# Patient Record
Sex: FEMALE | Race: WHITE | NOT HISPANIC OR LATINO | Employment: FULL TIME | URBAN - METROPOLITAN AREA
[De-identification: names, ages, dates, MRNs, and addresses within clinical notes are randomized per-mention and may not be internally consistent; named-entity substitution may affect disease eponyms.]

---

## 2017-01-17 ENCOUNTER — ALLSCRIPTS OFFICE VISIT (OUTPATIENT)
Dept: OTHER | Facility: OTHER | Age: 62
End: 2017-01-17

## 2018-01-12 VITALS
SYSTOLIC BLOOD PRESSURE: 138 MMHG | DIASTOLIC BLOOD PRESSURE: 90 MMHG | RESPIRATION RATE: 20 BRPM | HEIGHT: 62 IN | TEMPERATURE: 97.1 F | HEART RATE: 88 BPM

## 2018-01-18 NOTE — PROGRESS NOTES
Assessment    1  Encounter for preventive health examination (V70 0) (Z00 00)   2  Colon cancer screening (V76 51) (Z12 11)   3  Screening mammogram, encounter for (V76 12) (Z12 31)   4  BMI 40 0-44 9, adult (V85 41) (Z68 41)    Plan  Colon cancer screening    · 1 - Nazario Herrera MD (Gastroenterology) Physician Referral  Consult Only: the expectation  is that the referring provider will communicate back to the patient on treatment options  Evaluation and Treatment: the expectation is that the referred to provider will  communicate back to the patient on treatment options  Status: Active  Requested for:  [id number]  Care Summary provided  : Yes   · COLONOSCOPY; Status:Active; Requested for:07Sck3868;   Hypertension, Screening for cardiovascular condition, Screening for diabetes mellitus    · (1) CBC/PLT/DIFF; Status:Active; Requested for:[id number];    · (1) TSH; Status:Active; Requested for:57Qmp3357;   Screening for cardiovascular condition, Screening for diabetes mellitus    · (1) COMPREHENSIVE METABOLIC PANEL; Status:Active; Requested for:29Wct4609;    · (1) LIPID PANEL FASTING W DIRECT LDL REFLEX; Status:Active; Requested  for:43Hbb5420;   Screening mammogram, encounter for    · * MAMMO SCREENING BILATERAL W CAD; Status:Hold For - Scheduling; Requested  for:95Uja0993;     Discussion/Summary  health maintenance visit healthy adult female Currently, she eats a healthy diet and has an adequate exercise regimen  cervical cancer screening is not indicated Breast cancer screening: mammogram has been ordered  Colorectal cancer screening: colonoscopy has been ordered  Advice and education were given regarding weight loss  Shingles vaccine recommended  Chief Complaint  cpe      History of Present Illness  HM, Adult Female: The patient is being seen for a health maintenance evaluation  General Health: The patient's health since the last visit is described as good  Immunizations status: up to date  Lifestyle:  She consumes a diverse and healthy diet  She has weight concerns  She exercises regularly  She does not use tobacco    Reproductive health: the patient is postmenopausal    Screening:   HPI: She is walking her dog regularly  Review of Systems    Constitutional: No fever, no chills, feels well, no tiredness, no recent weight gain or weight loss  ENT: no complaints of earache, no loss of hearing, no nose bleeds, no nasal discharge, no sore throat, no hoarseness  Cardiovascular: No complaints of slow heart rate, no fast heart rate, no chest pain, no palpitations, no leg claudication, no lower extremity edema  Respiratory: No complaints of shortness of breath, no wheezing, no cough, no SOB on exertion, no orthopnea, no PND  Musculoskeletal: No complaints of arthralgias, no myalgias, no joint swelling or stiffness, no limb pain or swelling  Active Problems    1  Colon cancer screening (V76 51) (Z12 11)   2  Encounter for routine pelvic examination (V72 31) (Z01 419)   3  Hypertension (401 9) (I10)   4  Need for shingles vaccine (V04 89) (Z23)   5  Obesity (278 00) (E66 9)   6  Rectal/anal hemorrhage (569 3) (K62 5)   7  Screening mammogram, encounter for (V76 12) (Z12 31)    Family History  Mother    · Family history of cardiac disorder (V17 49) (Z80 55)  Sister    · Family history of hypothyroidism (V18 19) (Z83 49)  Brother    · Family history of diabetes mellitus (V18 0) (Z83 3)    Social History    · Former smoker (K31 29) (Z01 884)    Current Meds   1  Multi For Her Oral Tablet; 1 qd; Therapy: 80Lzw1118 to  Requested for: 59IOL6581 Recorded    Allergies    1   CORTISONE    Vitals   Recorded: 83QAR2433 02:05PM   Temperature 97 4 F   Heart Rate 84   Respiration 20   Systolic 218 mm Hg   Diastolic 78 mm Hg   Height 5 ft 2 in   Weight 243 lb    BMI Calculated 44 44 kg/m2   BSA Calculated 2 08 m2     Physical Exam    Constitutional   General appearance: No acute distress, well appearing and well nourished  Ears, Nose, Mouth, and Throat   External inspection of ears and nose: Normal     Otoscopic examination: Tympanic membranes translucent with normal light reflex  Canals patent without erythema  Oropharynx: Normal with no erythema, edema, exudate or lesions  Pulmonary   Auscultation of lungs: Clear to auscultation  Cardiovascular   Auscultation of heart: Normal rate and rhythm, normal S1 and S2, no murmurs  Abdomen   Abdomen: Non-tender, no masses  Liver and spleen: No hepatomegaly or splenomegaly  Musculoskeletal   Gait and station: Normal     Neurologic   Reflexes: 2+ and symmetric  Psychiatric   Mood and affect: Normal        Procedure    Procedure: Indication: routine screening   Pt forgot her glasses   Color vision was and the results were normal       Signatures   Electronically signed by : Michael Rooney DO; Dec  6 2016  2:28PM EST                       (Author)

## 2018-08-28 ENCOUNTER — OFFICE VISIT (OUTPATIENT)
Dept: FAMILY MEDICINE CLINIC | Facility: CLINIC | Age: 63
End: 2018-08-28
Payer: COMMERCIAL

## 2018-08-28 VITALS
RESPIRATION RATE: 16 BRPM | WEIGHT: 238 LBS | HEART RATE: 84 BPM | HEIGHT: 64 IN | BODY MASS INDEX: 40.63 KG/M2 | SYSTOLIC BLOOD PRESSURE: 148 MMHG | TEMPERATURE: 98.7 F | DIASTOLIC BLOOD PRESSURE: 90 MMHG

## 2018-08-28 DIAGNOSIS — Z23 NEED FOR ZOSTER VACCINATION: ICD-10-CM

## 2018-08-28 DIAGNOSIS — Z12.31 SCREENING MAMMOGRAM, ENCOUNTER FOR: ICD-10-CM

## 2018-08-28 DIAGNOSIS — R03.0 ELEVATED BLOOD PRESSURE READING: ICD-10-CM

## 2018-08-28 DIAGNOSIS — S46.911A STRAIN OF RIGHT SHOULDER, INITIAL ENCOUNTER: Primary | ICD-10-CM

## 2018-08-28 DIAGNOSIS — Z23 NEED FOR PNEUMOCOCCAL VACCINE: ICD-10-CM

## 2018-08-28 PROBLEM — M54.2 NECK PAIN: Status: ACTIVE | Noted: 2017-01-17

## 2018-08-28 PROCEDURE — 99214 OFFICE O/P EST MOD 30 MIN: CPT | Performed by: NURSE PRACTITIONER

## 2018-08-28 PROCEDURE — 90471 IMMUNIZATION ADMIN: CPT

## 2018-08-28 PROCEDURE — 90670 PCV13 VACCINE IM: CPT

## 2018-08-28 RX ORDER — ASPIRIN 81 MG/1
81 TABLET ORAL DAILY
COMMUNITY
End: 2019-09-23

## 2018-08-28 RX ORDER — CYCLOBENZAPRINE HCL 10 MG
10 TABLET ORAL 3 TIMES DAILY PRN
Qty: 30 TABLET | Refills: 0 | Status: SHIPPED | OUTPATIENT
Start: 2018-08-28 | End: 2018-10-16 | Stop reason: ALTCHOICE

## 2018-08-28 NOTE — PROGRESS NOTES
Assessment/Plan:    Due for CPE  She had labs done for insurance in the spring  She will schedule an appt for CPE  Problem List Items Addressed This Visit        Other    Elevated blood pressure reading     She checks her BP routinely at work and it is consistently 130s/70s  Continue to monitor  Other Visit Diagnoses     Strain of right shoulder, initial encounter    -  Primary    Relevant Medications    cyclobenzaprine (FLEXERIL) 10 mg tablet    Screening mammogram, encounter for        Relevant Orders    Mammo screening bilateral w cad    Need for zoster vaccination        Relevant Medications    Zoster Vac Recomb Adjuvanted ([address]) 50 MCG SUSR    Need for pneumococcal vaccine        Relevant Orders    PNEUMOCOCCAL CONJUGATE VACCINE 13-VALENT GREATER THAN 6 MONTHS (Completed)          There are no Patient Instructions on file for this visit  Return for Annual physical     Subjective:      Patient ID: Juan M Sosa is a 70 y o  female  Chief Complaint   Patient presents with    Pain     right shoulder-neck down thru breast-lj       C/o right shoulder pain for the past couple of months  It radiates into her neck and into the right side of her chest   She moved into a new apartment prior to onset of pain  It is getting worse and is losing some of the strength in her right arm  She takes Aleve which takes the edge off  Her fingers sometimes feel numb and tingly  She has not had a mammogram done in a couple of years  The following portions of the patient's history were reviewed and updated as appropriate: allergies, current medications, past family history, past medical history, past social history, past surgical history and problem list     Review of Systems   Constitutional: Negative for chills, fatigue and fever  Respiratory: Negative for cough, shortness of breath and wheezing  Cardiovascular: Negative for chest pain, palpitations and leg swelling  Gastrointestinal: Negative for abdominal pain, diarrhea, nausea and vomiting  Musculoskeletal:        See HPI   Skin: Negative for rash  Neurological: Positive for weakness and numbness  Negative for dizziness and headaches  Current Outpatient Prescriptions   Medication Sig Dispense Refill    aspirin (ECOTRIN LOW STRENGTH) 81 mg EC tablet Take 81 mg by mouth daily      Multiple Vitamins-Minerals (MULTI FOR HER PO) Take by mouth daily      cyclobenzaprine (FLEXERIL) 10 mg tablet Take 1 tablet (10 mg total) by mouth 3 (three) times a day as needed for muscle spasms 30 tablet 0    Zoster Vac Recomb Adjuvanted (SHINGRIX) 50 MCG SUSR Inject 0 5 mL into a muscle once for 1 dose 1 each 0     No current facility-administered medications for this visit  Objective:    /90   Pulse 84   Temp 98 7 °F (37 1 °C)   Resp 16   Ht 5' 4" (1 626 m)   Wt 108 kg (238 lb)   BMI 40 85 kg/m²        Physical Exam   Constitutional: She appears well-developed and well-nourished  HENT:   Right Ear: Tympanic membrane, external ear and ear canal normal    Left Ear: Tympanic membrane, external ear and ear canal normal    Nose: No mucosal edema  Mouth/Throat: Oropharynx is clear and moist and mucous membranes are normal    Eyes: Conjunctivae are normal    Cardiovascular: Normal rate, regular rhythm and normal heart sounds  Pulmonary/Chest: Effort normal and breath sounds normal    Abdominal: Bowel sounds are normal  She exhibits no distension  There is no splenomegaly or hepatomegaly  There is no tenderness  Genitourinary: No breast swelling, tenderness, discharge or bleeding  Genitourinary Comments: No breast masses or skin changes   Musculoskeletal:        Right shoulder: She exhibits decreased range of motion and tenderness  Tenderness over right trapezius and SCM musculature   Lymphadenopathy:        Right cervical: No superficial cervical adenopathy present         Left cervical: No superficial cervical adenopathy present  Skin: No rash noted  Psychiatric: She has a normal mood and affect  Nursing note and vitals reviewed               Sergio Brooks

## 2018-09-17 ENCOUNTER — TELEPHONE (OUTPATIENT)
Dept: FAMILY MEDICINE CLINIC | Facility: CLINIC | Age: 63
End: 2018-09-17

## 2018-09-17 ENCOUNTER — OFFICE VISIT (OUTPATIENT)
Dept: FAMILY MEDICINE CLINIC | Facility: CLINIC | Age: 63
End: 2018-09-17
Payer: COMMERCIAL

## 2018-09-17 VITALS
WEIGHT: 240 LBS | SYSTOLIC BLOOD PRESSURE: 148 MMHG | RESPIRATION RATE: 18 BRPM | HEIGHT: 61 IN | HEART RATE: 80 BPM | BODY MASS INDEX: 45.31 KG/M2 | TEMPERATURE: 98.3 F | DIASTOLIC BLOOD PRESSURE: 94 MMHG

## 2018-09-17 DIAGNOSIS — Z12.11 ENCOUNTER FOR SCREENING COLONOSCOPY: ICD-10-CM

## 2018-09-17 DIAGNOSIS — Z00.00 ROUTINE ADULT HEALTH MAINTENANCE: Primary | ICD-10-CM

## 2018-09-17 DIAGNOSIS — Z11.59 ENCOUNTER FOR HEPATITIS C SCREENING TEST FOR LOW RISK PATIENT: ICD-10-CM

## 2018-09-17 DIAGNOSIS — Z13.29 SCREENING FOR THYROID DISORDER: ICD-10-CM

## 2018-09-17 DIAGNOSIS — I10 ESSENTIAL HYPERTENSION: ICD-10-CM

## 2018-09-17 DIAGNOSIS — Z13.6 SCREENING FOR CARDIOVASCULAR CONDITION: ICD-10-CM

## 2018-09-17 DIAGNOSIS — Z23 NEED FOR IMMUNIZATION AGAINST INFLUENZA: ICD-10-CM

## 2018-09-17 PROBLEM — R03.0 ELEVATED BLOOD PRESSURE READING: Status: RESOLVED | Noted: 2018-08-28 | Resolved: 2018-09-17

## 2018-09-17 PROCEDURE — 99397 PER PM REEVAL EST PAT 65+ YR: CPT | Performed by: NURSE PRACTITIONER

## 2018-09-17 PROCEDURE — 90662 IIV NO PRSV INCREASED AG IM: CPT

## 2018-09-17 PROCEDURE — 90471 IMMUNIZATION ADMIN: CPT

## 2018-09-17 NOTE — PROGRESS NOTES
FAMILY PRACTICE HEALTH MAINTENANCE OFFICE VISIT  [facility]    NAME: Zenaida Logan  AGE: 70 y o  SEX: female  : 1955     DATE: 2018    Assessment and Plan     Problem List Items Addressed This Visit        Cardiovascular and Mediastinum    Essential hypertension     BP has been stable at work  She will continue to monitor as well as work on diet, exercise, and weight loss  She will follow up in 6 months or sooner as needed           Other Visit Diagnoses     Routine adult health maintenance    -  Primary    Need for immunization against influenza        Relevant Orders    influenza vaccine, 5890-7419, high-dose, PF 0 5 mL, for patients 65 yr+ (FLUZONE HIGH-DOSE) (Completed)    Screening for cardiovascular condition        Relevant Orders    CBC and differential    Comprehensive metabolic panel    Lipid panel    UA (URINE) with reflex to Microscopic    Screening for thyroid disorder        Relevant Orders    TSH, 3rd generation with Free T4 reflex    Encounter for hepatitis C screening test for low risk patient        Encounter for screening colonoscopy        Relevant Orders    Ambulatory referral to Gastroenterology            · Patient Counseling:   · Nutrition: Stressed importance of a well balanced diet, moderation of sodium/saturated fat, caloric balance and sufficient intake of fiber  · Exercise: Stressed the importance of regular exercise with a goal of 150 minutes per week  · Dental Health: Discussed daily flossing and brushing and regular dental visits     · Immunizations reviewed: due for flu  · Discussed benefits of screening   · Discussed the patient's BMI with her  The BMI is above average; BMI management plan is completed     Return in about 6 months (around 3/17/2019)  Chief Complaint     Chief Complaint   Patient presents with    Physical Exam     akrma        History of Present Illness     Here today for CPE     Overdue for colon cancer screening  Colonoscopy done 2012, repeat in 1-2 years was recommended  She has both internal and external hemorrhoids  She does note occasional bright red blood per rectum  Mammogram scheduled for next week  Working on diet, exercise, and weight loss  History of HTN, BP has been well controlled  She checks it daily at work and it is 130s/80s  Well Adult Physical   Patient here for a comprehensive physical exam       Diet and Physical Activity  Diet: well balanced diet  Weight concerns: Patient has class 3 obesity (BMI >40)  Exercise: frequently      Depression Screen  PHQ-9 Depression Screening    PHQ-9:    Frequency of the following problems over the past two weeks:       Little interest or pleasure in doing things:  0 - not at all  Feeling down, depressed, or hopeless:  0 - not at all  PHQ-2 Score:  0          General Health  Hearing: Normal:  bilateral  Vision: no vision problems and goes for regular eye exams  Dental: regular dental visits and brushes teeth twice daily    Reproductive Health        The following portions of the patient's history were reviewed and updated as appropriate: allergies, current medications, past family history, past medical history, past social history, past surgical history and problem list     Review of Systems     Review of Systems   Constitutional: Negative for chills, fatigue and fever  Respiratory: Negative for cough, shortness of breath and wheezing  Cardiovascular: Negative for chest pain, palpitations and leg swelling  Gastrointestinal: Negative for abdominal pain, diarrhea, nausea and vomiting  Skin: Negative for rash  Neurological: Negative for dizziness and headaches  Psychiatric/Behavioral: Negative  Past Medical History     History reviewed  No pertinent past medical history      Past Surgical History     Past Surgical History:   Procedure Laterality Date    APPENDECTOMY      CHOLECYSTECTOMY      HYSTERECTOMY         Social History Social History     Social History    Marital status:      Spouse name: N/A    Number of children: N/A    Years of education: N/A     Social History Main Topics    Smoking status: Former Smoker    Smokeless tobacco: Never Used    Alcohol use None    Drug use: Unknown    Sexual activity: Not Asked     Other Topics Concern    None     Social History Narrative    None       Family History     Family History   Problem Relation Age of Onset    Heart disease Mother         Cardiac Disorder     Hypothyroidism Sister     Diabetes Brother     Substance Abuse Neg Hx     Mental illness Neg Hx        Current Medications       Current Outpatient Prescriptions:     aspirin (ECOTRIN LOW STRENGTH) 81 mg EC tablet, Take 81 mg by mouth daily, Disp: , Rfl:     cyclobenzaprine (FLEXERIL) 10 mg tablet, Take 1 tablet (10 mg total) by mouth 3 (three) times a day as needed for muscle spasms, Disp: 30 tablet, Rfl: 0    Multiple Vitamins-Minerals (MULTI FOR HER PO), Take by mouth daily, Disp: , Rfl:      Allergies     Allergies   Allergen Reactions    Cortisone      Reaction Date: 25Jul2012; Annotation - [id number]: swelling       Objective     /94   Pulse 80   Temp 98 3 °F (36 8 °C)   Resp 18   Ht 5' 0 75" (1 543 m)   Wt 109 kg (240 lb)   BMI 45 72 kg/m²      Physical Exam   Constitutional: She is oriented to person, place, and time  She appears well-developed and well-nourished  HENT:   Head: Normocephalic  Right Ear: External ear normal    Left Ear: External ear normal    Nose: Nose normal    Mouth/Throat: Oropharynx is clear and moist    Eyes: Conjunctivae and EOM are normal  Pupils are equal, round, and reactive to light  Neck: Neck supple  No thyromegaly present  Cardiovascular: Normal rate, regular rhythm, normal heart sounds and intact distal pulses  No murmur heard  Pulmonary/Chest: Effort normal and breath sounds normal    Abdominal: Soft   Bowel sounds are normal  She exhibits no distension  Genitourinary:   Genitourinary Comments: Pt declines breast exam today  This was  done at previous visit 1 month ago  Musculoskeletal: Normal range of motion  She exhibits no edema  Lymphadenopathy:     She has no cervical adenopathy  Neurological: She is alert and oriented to person, place, and time  She has normal reflexes  Skin: Skin is warm, dry and intact  Psychiatric: She has a normal mood and affect  Nursing note and vitals reviewed           Visual Acuity Screening    Right eye Left eye Both eyes   Without correction: 20/70 20/70 20/50   With correction:          Health Maintenance     Health Maintenance   Topic Date Due    INFLUENZA VACCINE  09/01/2018    Pneumococcal PPSV23/PCV13 65+ Years / Low and Medium Risk (2 of 2 - PPSV23) 08/28/2019    Fall Risk  09/17/2019    Depression Screening PHQ  09/17/2019    Urinary Incontinence Screening  09/17/2019    CRC Screening: Colonoscopy  07/08/2022    DTaP,Tdap,and Td Vaccines (2 - Td) 09/12/2024     Immunization History   Administered Date(s) Administered    Influenza TIV (IM) 10/20/2016    Influenza, high dose seasonal 0 5 mL 09/17/2018    Pneumococcal Conjugate 13-Valent 08/28/2018    Tdap 09/12/2014       Madeline Flynn, [address] [facility]

## 2018-09-17 NOTE — PATIENT INSTRUCTIONS

## 2018-09-17 NOTE — TELEPHONE ENCOUNTER
Pt was seen today, forgot to give Dr her health screening form to be completed  Pls fill out and fax to [phone]

## 2018-09-17 NOTE — ASSESSMENT & PLAN NOTE
BP has been stable at work  She will continue to monitor as well as work on diet, exercise, and weight loss    She will follow up in 6 months or sooner as needed

## 2018-09-25 ENCOUNTER — HOSPITAL ENCOUNTER (OUTPATIENT)
Dept: RADIOLOGY | Facility: HOSPITAL | Age: 63
Discharge: HOME | End: 2018-09-25
Payer: COMMERCIAL

## 2018-09-25 DIAGNOSIS — Z12.31 SCREENING MAMMOGRAM, ENCOUNTER FOR: ICD-10-CM

## 2018-09-25 PROCEDURE — 77067 SCR MAMMO BI INCL CAD: CPT

## 2018-10-01 ENCOUNTER — OFFICE VISIT (OUTPATIENT)
Dept: GASTROENTEROLOGY | Facility: CLINIC | Age: 63
End: 2018-10-01
Payer: COMMERCIAL

## 2018-10-01 VITALS
BODY MASS INDEX: 45.46 KG/M2 | TEMPERATURE: 98.6 F | HEIGHT: 61 IN | DIASTOLIC BLOOD PRESSURE: 110 MMHG | WEIGHT: 240.8 LBS | SYSTOLIC BLOOD PRESSURE: 163 MMHG | HEART RATE: 72 BPM

## 2018-10-01 DIAGNOSIS — Z86.010 HISTORY OF COLON POLYPS: Primary | ICD-10-CM

## 2018-10-01 DIAGNOSIS — Z12.11 ENCOUNTER FOR SCREENING COLONOSCOPY: ICD-10-CM

## 2018-10-01 PROCEDURE — 99203 OFFICE O/P NEW LOW 30 MIN: CPT | Performed by: INTERNAL MEDICINE

## 2018-10-01 NOTE — PROGRESS NOTES
Consultation - [address] [facility]  Marietta Lester 1955 female         Chief Complaint:  For colonoscopy    HPI:  41-year-old female was referred for colonoscopy  She has history of colon polyps on her last colonoscopy was in 2012  Patient has regular bowel movements and denies any blood or mucus in the stool  Appetite is good and denies any recent weight loss  Denies any abdominal pain, nausea, or vomiting  Has no heartburn or acid reflux  Denies any difficulty swallowing  REVIEW OF SYSTEMS: Review of Systems   Constitutional: Negative for activity change, appetite change, chills, diaphoresis, fatigue, fever and unexpected weight change  HENT: Negative for ear discharge, ear pain, facial swelling, hearing loss, nosebleeds, sore throat, tinnitus and voice change  Eyes: Negative for pain, discharge, redness, itching and visual disturbance  Respiratory: Negative for apnea, cough, chest tightness, shortness of breath and wheezing  Cardiovascular: Negative for chest pain and palpitations  Gastrointestinal:        As noted in HPI   Endocrine: Negative for cold intolerance, heat intolerance and polyuria  Genitourinary: Negative for difficulty urinating, dysuria, flank pain, hematuria and urgency  Musculoskeletal: Negative for arthralgias, back pain, gait problem, joint swelling and myalgias  Skin: Negative for rash and wound  Neurological: Negative for dizziness, tremors, seizures, speech difficulty, light-headedness, numbness and headaches  Hematological: Negative for adenopathy  Does not bruise/bleed easily  Psychiatric/Behavioral: Negative for agitation, behavioral problems and confusion  The patient is not nervous/anxious           Past Medical History:   Diagnosis Date    Colon polyp       Past Surgical History:   Procedure Laterality Date    APPENDECTOMY      CHOLECYSTECTOMY      COLONOSCOPY  2012    dr [facility]    HYSTERECTOMY       Social History Social History    Marital status:      Spouse name: N/A    Number of children: N/A    Years of education: N/A     Occupational History    Not on file  Social History Main Topics    Smoking status: Former Smoker    Smokeless tobacco: Never Used    Alcohol use Yes    Drug use: No    Sexual activity: Not on file     Other Topics Concern    Not on file     Social History Narrative    No narrative on file     Family History   Problem Relation Age of Onset    Heart disease Mother         Cardiac Disorder     Hypothyroidism Sister     Diabetes Brother     Substance Abuse Neg Hx     Mental illness Neg Hx      Cortisone  Current Outpatient Prescriptions   Medication Sig Dispense Refill    aspirin (ECOTRIN LOW STRENGTH) 81 mg EC tablet Take 81 mg by mouth daily      Multiple Vitamins-Minerals (MULTI FOR HER PO) Take by mouth daily      cyclobenzaprine (FLEXERIL) 10 mg tablet Take 1 tablet (10 mg total) by mouth 3 (three) times a day as needed for muscle spasms (Patient not taking: Reported on 10/1/2018 ) 30 tablet 0    Na Sulfate-K Sulfate-Mg Sulf (SUPREP BOWEL PREP KIT) 17 5-3 13-1 6 GM/180ML SOLN Take 2 Bottles by mouth see administration instructions Please follow the instructions from the office 2 Bottle 0     No current facility-administered medications for this visit  Blood pressure (!) 163/110, pulse 72, temperature 98 6 °F (37 °C), temperature source Tympanic, height 5' 0 75" (1 543 m), weight 109 kg (240 lb 12 8 oz)  PHYSICAL EXAM: Physical Exam   Constitutional: She is oriented to person, place, and time  She appears well-developed and well-nourished  HENT:   Head: Normocephalic and atraumatic  Nose: Nose normal    Mouth/Throat: Oropharynx is clear and moist    Eyes: Conjunctivae are normal  Right eye exhibits no discharge  Left eye exhibits no discharge  No scleral icterus  Neck: Neck supple  No JVD present  No tracheal deviation present  No thyromegaly present  Cardiovascular: Normal rate, regular rhythm and normal heart sounds  Exam reveals no gallop and no friction rub  No murmur heard  Pulmonary/Chest: Effort normal and breath sounds normal  No respiratory distress  She has no wheezes  She has no rales  She exhibits no tenderness  Abdominal: Soft  Bowel sounds are normal  She exhibits no distension and no mass  There is no tenderness  There is no rebound and no guarding  No hernia  Musculoskeletal: She exhibits no edema, tenderness or deformity  Lymphadenopathy:     She has no cervical adenopathy  Neurological: She is alert and oriented to person, place, and time  Skin: Skin is warm and dry  No rash noted  No erythema  Psychiatric: She has a normal mood and affect  Her behavior is normal  Thought content normal         No results found for: WBC, HGB, HCT, MCV, PLT  No results found for: GLUCOSE, CALCIUM, NA, K, CO2, CL, BUN, CREATININE  No results found for: ALT, AST, GGT, ALKPHOS, BILITOT  No results found for: INR, PROTIME    No results found  ASSESSMENT & PLAN:    History of colon polyps  Personal history of colon polyps- patient is at increased risk for colon cancer screening  Rule out colorectal lesions including polyps or malignancy     -Schedule for colonoscopy  -High-fiber diet     -Patient was given instructions about the colonoscopy prep     -Patient was explained about  the risks and benefits of the procedure  Risks including but not limited to bleeding, infection, perforation were explained in detail  Also explained about less than 100% sensitivity with the exam and other alternatives

## 2018-10-01 NOTE — LETTER
October 1, 2018     Gabe Middle Bass, [address] [address]    Patient: Marquis Bliss   YOB: 1955   Date of Visit: 10/1/2018       Dear Dr Severo Dillon: Thank you for referring Chanel Yoder to me for evaluation  Below are my notes for this consultation  If you have questions, please do not hesitate to call me  I look forward to following your patient along with you  Sincerely,        Charmaine Olsen MD        CC: No Recipients  Charmaine Olsen MD  10/1/2018  5:15 PM  Sign at close encounter  Consultation - [facility]  Marquis Bliss 1955 female         Chief Complaint:  For colonoscopy    HPI:  68-year-old female was referred for colonoscopy  She has history of colon polyps on her last colonoscopy was in 2012  Patient has regular bowel movements and denies any blood or mucus in the stool  Appetite is good and denies any recent weight loss  Denies any abdominal pain, nausea, or vomiting  Has no heartburn or acid reflux  Denies any difficulty swallowing  REVIEW OF SYSTEMS: Review of Systems   Constitutional: Negative for activity change, appetite change, chills, diaphoresis, fatigue, fever and unexpected weight change  HENT: Negative for ear discharge, ear pain, facial swelling, hearing loss, nosebleeds, sore throat, tinnitus and voice change  Eyes: Negative for pain, discharge, redness, itching and visual disturbance  Respiratory: Negative for apnea, cough, chest tightness, shortness of breath and wheezing  Cardiovascular: Negative for chest pain and palpitations  Gastrointestinal:        As noted in HPI   Endocrine: Negative for cold intolerance, heat intolerance and polyuria  Genitourinary: Negative for difficulty urinating, dysuria, flank pain, hematuria and urgency  Musculoskeletal: Negative for arthralgias, back pain, gait problem, joint swelling and myalgias  Skin: Negative for rash and wound  Neurological: Negative for dizziness, tremors, seizures, speech difficulty, light-headedness, numbness and headaches  Hematological: Negative for adenopathy  Does not bruise/bleed easily  Psychiatric/Behavioral: Negative for agitation, behavioral problems and confusion  The patient is not nervous/anxious  Past Medical History:   Diagnosis Date    Colon polyp       Past Surgical History:   Procedure Laterality Date    APPENDECTOMY      CHOLECYSTECTOMY      COLONOSCOPY  2012    dr [facility]    HYSTERECTOMY       Social History     Social History    Marital status:      Spouse name: N/A    Number of children: N/A    Years of education: N/A     Occupational History    Not on file  Social History Main Topics    Smoking status: Former Smoker    Smokeless tobacco: Never Used    Alcohol use Yes    Drug use: No    Sexual activity: Not on file     Other Topics Concern    Not on file     Social History Narrative    No narrative on file     Family History   Problem Relation Age of Onset    Heart disease Mother         Cardiac Disorder     Hypothyroidism Sister     Diabetes Brother     Substance Abuse Neg Hx     Mental illness Neg Hx      Cortisone  Current Outpatient Prescriptions   Medication Sig Dispense Refill    aspirin (ECOTRIN LOW STRENGTH) 81 mg EC tablet Take 81 mg by mouth daily      Multiple Vitamins-Minerals (MULTI FOR HER PO) Take by mouth daily      cyclobenzaprine (FLEXERIL) 10 mg tablet Take 1 tablet (10 mg total) by mouth 3 (three) times a day as needed for muscle spasms (Patient not taking: Reported on 10/1/2018 ) 30 tablet 0    Na Sulfate-K Sulfate-Mg Sulf (SUPREP BOWEL PREP KIT) 17 5-3 13-1 6 GM/180ML SOLN Take 2 Bottles by mouth see administration instructions Please follow the instructions from the office 2 Bottle 0     No current facility-administered medications for this visit          Blood pressure (!) 163/110, pulse 72, temperature 98 6 °F (37 °C), temperature source Tympanic, height 5' 0 75" (1 543 m), weight 109 kg (240 lb 12 8 oz)  PHYSICAL EXAM: Physical Exam   Constitutional: She is oriented to person, place, and time  She appears well-developed and well-nourished  HENT:   Head: Normocephalic and atraumatic  Nose: Nose normal    Mouth/Throat: Oropharynx is clear and moist    Eyes: Conjunctivae are normal  Right eye exhibits no discharge  Left eye exhibits no discharge  No scleral icterus  Neck: Neck supple  No JVD present  No tracheal deviation present  No thyromegaly present  Cardiovascular: Normal rate, regular rhythm and normal heart sounds  Exam reveals no gallop and no friction rub  No murmur heard  Pulmonary/Chest: Effort normal and breath sounds normal  No respiratory distress  She has no wheezes  She has no rales  She exhibits no tenderness  Abdominal: Soft  Bowel sounds are normal  She exhibits no distension and no mass  There is no tenderness  There is no rebound and no guarding  No hernia  Musculoskeletal: She exhibits no edema, tenderness or deformity  Lymphadenopathy:     She has no cervical adenopathy  Neurological: She is alert and oriented to person, place, and time  Skin: Skin is warm and dry  No rash noted  No erythema  Psychiatric: She has a normal mood and affect  Her behavior is normal  Thought content normal         No results found for: WBC, HGB, HCT, MCV, PLT  No results found for: GLUCOSE, CALCIUM, NA, K, CO2, CL, BUN, CREATININE  No results found for: ALT, AST, GGT, ALKPHOS, BILITOT  No results found for: INR, PROTIME    No results found  ASSESSMENT & PLAN:    History of colon polyps  Personal history of colon polyps- patient is at increased risk for colon cancer screening  Rule out colorectal lesions including polyps or malignancy     -Schedule for colonoscopy      -High-fiber diet     -Patient was given instructions about the colonoscopy prep     -Patient was explained about  the risks and benefits of the procedure  Risks including but not limited to bleeding, infection, perforation were explained in detail  Also explained about less than 100% sensitivity with the exam and other alternatives

## 2018-10-02 PROBLEM — Z12.11 SCREEN FOR COLON CANCER: Status: ACTIVE | Noted: 2018-10-02

## 2018-10-02 PROBLEM — Z86.010 HX OF COLONIC POLYPS: Status: ACTIVE | Noted: 2018-10-02

## 2018-10-08 ENCOUNTER — OFFICE VISIT (OUTPATIENT)
Dept: FAMILY MEDICINE CLINIC | Facility: CLINIC | Age: 63
End: 2018-10-08
Payer: COMMERCIAL

## 2018-10-08 VITALS
HEART RATE: 84 BPM | BODY MASS INDEX: 44.18 KG/M2 | RESPIRATION RATE: 20 BRPM | SYSTOLIC BLOOD PRESSURE: 142 MMHG | HEIGHT: 61 IN | TEMPERATURE: 98.2 F | WEIGHT: 234 LBS | DIASTOLIC BLOOD PRESSURE: 94 MMHG

## 2018-10-08 DIAGNOSIS — E78.2 MIXED HYPERLIPIDEMIA: ICD-10-CM

## 2018-10-08 DIAGNOSIS — J06.9 ACUTE UPPER RESPIRATORY INFECTION: Primary | ICD-10-CM

## 2018-10-08 PROCEDURE — 99213 OFFICE O/P EST LOW 20 MIN: CPT | Performed by: NURSE PRACTITIONER

## 2018-10-08 RX ORDER — ATORVASTATIN CALCIUM 10 MG/1
10 TABLET, FILM COATED ORAL DAILY
Qty: 30 TABLET | Refills: 3 | Status: SHIPPED | OUTPATIENT
Start: 2018-10-08 | End: 2019-09-23 | Stop reason: SDUPTHER

## 2018-10-08 NOTE — PROGRESS NOTES
Assessment/Plan:    Advised on supportive care of viral illness  Recheck if symptoms do not improve in about a week  Problem List Items Addressed This Visit        Other    Mixed hyperlipidemia     Reviewed labs  She as on a statin in the past and tolerated it well  Will start on Atorvastatin with repeat labs in 3 months  Reviewed lifestyle measures and dietary modifications         Relevant Medications    atorvastatin (LIPITOR) 10 mg tablet    Other Relevant Orders    Comprehensive metabolic panel    Lipid panel      Other Visit Diagnoses     Acute upper respiratory infection    -  Primary          Patient Instructions   Drinking plenty of fluids, saline nasal rinses or nasal spray, and steam or cool air humidification are all effective ways of managing symptoms  May take Mucinex D for sinus congestion, or Coricidin HBP if you have a heart condition or high blood pressure  Mucinex or Robitussin DM are used to control cough symptoms and include an expectorant  May take Ibuprofen or Tylenol as needed for pain or fevers  Recommend recheck if symptoms do not resolve or improve within 1 week  Return if symptoms worsen or fail to improve  Subjective:      Patient ID: Kerry Harrell is a 70 y o  female  Chief Complaint   Patient presents with    Cough     harsh productive cough    Headache    Fatigue    Generalized Body Aches     for the past few days  [id number]       For the past couple of days, she has had sinus congestion, a dry cough, hoarseness, and a sore throat  Denies fevers, SOB, or wheezing  She is taking OTC decongestants which aren't helping  Had labs done prior to visit  She has an appt with cardiology scheduled for the end of the month  Has been checking BP at work and it has been stable           The following portions of the patient's history were reviewed and updated as appropriate: allergies, current medications, past family history, past medical history, past social history, past surgical history and problem list     Review of Systems   Constitutional: Negative for chills, fatigue and fever  HENT: Positive for congestion, sore throat and voice change  Negative for ear pain, postnasal drip, rhinorrhea and sinus pressure  Respiratory: Positive for cough  Negative for shortness of breath and wheezing  Cardiovascular: Negative for chest pain  Gastrointestinal: Negative for abdominal pain, diarrhea, nausea and vomiting  Musculoskeletal: Negative for arthralgias  Skin: Negative for rash  Neurological: Positive for headaches  Current Outpatient Prescriptions   Medication Sig Dispense Refill    aspirin (ECOTRIN LOW STRENGTH) 81 mg EC tablet Take 81 mg by mouth daily      Multiple Vitamins-Minerals (MULTI FOR HER PO) Take by mouth daily      Na Sulfate-K Sulfate-Mg Sulf (SUPREP BOWEL PREP KIT) 17 5-3 13-1 6 GM/180ML SOLN Take 2 Bottles by mouth see administration instructions Please follow the instructions from the office 2 Bottle 0    atorvastatin (LIPITOR) 10 mg tablet Take 1 tablet (10 mg total) by mouth daily 30 tablet 3    cyclobenzaprine (FLEXERIL) 10 mg tablet Take 1 tablet (10 mg total) by mouth 3 (three) times a day as needed for muscle spasms (Patient not taking: Reported on 10/1/2018 ) 30 tablet 0     No current facility-administered medications for this visit  Objective:    /94   Pulse 84   Temp 98 2 °F (36 8 °C)   Resp 20   Ht 5' 0 75" (1 543 m)   Wt 106 kg (234 lb)   BMI 44 58 kg/m²        Physical Exam   Constitutional: She appears well-developed and well-nourished  HENT:   Head: Normocephalic and atraumatic  Right Ear: External ear and ear canal normal  Tympanic membrane is retracted  Left Ear: External ear and ear canal normal  Tympanic membrane is retracted  Nose: Mucosal edema present  No rhinorrhea     Mouth/Throat: Uvula is midline, oropharynx is clear and moist and mucous membranes are normal    Eyes: Conjunctivae are normal    Neck: Neck supple  No edema present  No thyromegaly present  Cardiovascular: Normal rate, regular rhythm, normal heart sounds and intact distal pulses  No murmur heard  Pulmonary/Chest: Effort normal and breath sounds normal    Abdominal: Bowel sounds are normal  She exhibits no distension  There is no splenomegaly or hepatomegaly  There is no tenderness  Lymphadenopathy:     She has cervical adenopathy  Right cervical: No superficial cervical adenopathy present  Left cervical: No superficial cervical adenopathy present  Skin: Skin is warm, dry and intact  No rash noted  Psychiatric: She has a normal mood and affect  Nursing note and vitals reviewed               Brendan Olmedo

## 2018-10-08 NOTE — ASSESSMENT & PLAN NOTE
Reviewed labs  She as on a statin in the past and tolerated it well  Will start on Atorvastatin with repeat labs in 3 months    Reviewed lifestyle measures and dietary modifications

## 2018-10-08 NOTE — LETTER
October 8, 2018     Patient: Verena Townsend   YOB: 1955   Date of Visit: 10/8/2018       To Whom it May Concern:    Twila Soria is under my professional care  She was seen in my office on 10/8/2018  She may return to work on 10/9/18  If you have any questions or concerns, please don't hesitate to call           Sincerely,          FREDDY Luo        CC: No Recipients

## 2018-10-16 ENCOUNTER — OFFICE VISIT (OUTPATIENT)
Dept: FAMILY MEDICINE CLINIC | Facility: CLINIC | Age: 63
End: 2018-10-16
Payer: COMMERCIAL

## 2018-10-16 ENCOUNTER — TELEPHONE (OUTPATIENT)
Dept: FAMILY MEDICINE CLINIC | Facility: CLINIC | Age: 63
End: 2018-10-16

## 2018-10-16 VITALS
DIASTOLIC BLOOD PRESSURE: 82 MMHG | HEART RATE: 82 BPM | TEMPERATURE: 97.6 F | RESPIRATION RATE: 16 BRPM | WEIGHT: 236 LBS | SYSTOLIC BLOOD PRESSURE: 134 MMHG | BODY MASS INDEX: 44.56 KG/M2 | HEIGHT: 61 IN

## 2018-10-16 DIAGNOSIS — N39.0 ACUTE UTI: Primary | ICD-10-CM

## 2018-10-16 LAB
SL AMB  POCT GLUCOSE, UA: NORMAL
SL AMB LEUKOCYTE ESTERASE,UA: ABNORMAL
SL AMB POCT BILIRUBIN,UA: NEGATIVE
SL AMB POCT BLOOD,UA: 250
SL AMB POCT CLARITY,UA: ABNORMAL
SL AMB POCT COLOR,UA: YELLOW
SL AMB POCT KETONES,UA: NEGATIVE
SL AMB POCT NITRITE,UA: POSITIVE
SL AMB POCT PH,UA: 5
SL AMB POCT SPECIFIC GRAVITY,UA: 1.01
SL AMB POCT URINE PROTEIN: 100
SL AMB POCT UROBILINOGEN: NORMAL

## 2018-10-16 PROCEDURE — 81003 URINALYSIS AUTO W/O SCOPE: CPT | Performed by: NURSE PRACTITIONER

## 2018-10-16 PROCEDURE — 3008F BODY MASS INDEX DOCD: CPT | Performed by: NURSE PRACTITIONER

## 2018-10-16 PROCEDURE — 99213 OFFICE O/P EST LOW 20 MIN: CPT | Performed by: NURSE PRACTITIONER

## 2018-10-16 RX ORDER — NITROFURANTOIN 25; 75 MG/1; MG/1
100 CAPSULE ORAL 2 TIMES DAILY
Qty: 10 CAPSULE | Refills: 0 | Status: SHIPPED | OUTPATIENT
Start: 2018-10-16 | End: 2018-10-21

## 2018-10-16 NOTE — PROGRESS NOTES
Assessment/Plan:    Urine sent for culture  Will start on Macrobid and push fluids  Reviewed supportive care  Follow up as needed    Problem List Items Addressed This Visit     None      Visit Diagnoses     Acute UTI    -  Primary    Relevant Medications    nitrofurantoin (MACROBID) 100 mg capsule    Other Relevant Orders    POCT urine dip auto non-scope (Completed)    Urine culture          There are no Patient Instructions on file for this visit  Return if symptoms worsen or fail to improve  Subjective:      Patient ID: Kailey Huggins is a 70 y o  female  Chief Complaint   Patient presents with    Difficulty Urinating     started yesterday     Frequent urination       Since yesterday, she has had urinary frequency, hesitancy, and urgency  Denies dysuria, hematuria, fevers, flank pain, or n/v  She is not taking anything OT for symptoms  The following portions of the patient's history were reviewed and updated as appropriate: allergies, current medications, past family history, past medical history, past social history, past surgical history and problem list     Review of Systems   Constitutional: Negative for chills, fatigue and fever  Respiratory: Negative for cough and shortness of breath  Cardiovascular: Negative for chest pain  Gastrointestinal: Negative for abdominal pain, diarrhea, nausea and vomiting  Genitourinary: Positive for frequency and urgency  Negative for dysuria, flank pain, hematuria, pelvic pain and vaginal discharge  Musculoskeletal: Negative for arthralgias and back pain           Current Outpatient Prescriptions   Medication Sig Dispense Refill    aspirin (ECOTRIN LOW STRENGTH) 81 mg EC tablet Take 81 mg by mouth daily      atorvastatin (LIPITOR) 10 mg tablet Take 1 tablet (10 mg total) by mouth daily 30 tablet 3    Multiple Vitamins-Minerals (MULTI FOR HER PO) Take by mouth daily      nitrofurantoin (MACROBID) 100 mg capsule Take 1 capsule (100 mg total) by mouth 2 (two) times a day for 5 days 10 capsule 0     No current facility-administered medications for this visit  Objective:    /82   Pulse 82   Temp 97 6 °F (36 4 °C)   Resp 16   Ht 5' 0 75" (1 543 m)   Wt 107 kg (236 lb)   BMI 44 96 kg/m²        Physical Exam   Constitutional: She appears well-developed and well-nourished  Cardiovascular: Normal rate, regular rhythm, S1 normal and S2 normal     Pulmonary/Chest: Effort normal and breath sounds normal    Abdominal: Bowel sounds are normal  She exhibits no distension  There is no hepatosplenomegaly  There is no tenderness  There is no CVA tenderness  Skin: Skin is warm, dry and intact  Psychiatric: She has a normal mood and affect  Nursing note and vitals reviewed               Zakiya Espinal

## 2018-10-16 NOTE — TELEPHONE ENCOUNTER
Jeniffer Klein     [phone] page Zahraa Richards from [facility]    Has an UTI and would like to know if Lora Thomson can call something in for her      Thank you

## 2018-11-06 ENCOUNTER — HOSPITAL ENCOUNTER (EMERGENCY)
Facility: HOSPITAL | Age: 63
Discharge: HOME | End: 2018-11-06
Attending: EMERGENCY MEDICINE
Payer: COMMERCIAL

## 2018-11-06 ENCOUNTER — APPOINTMENT (EMERGENCY)
Dept: RADIOLOGY | Facility: HOSPITAL | Age: 63
End: 2018-11-06
Payer: COMMERCIAL

## 2018-11-06 VITALS
HEIGHT: 61 IN | RESPIRATION RATE: 19 BRPM | TEMPERATURE: 98.1 F | WEIGHT: 229 LBS | HEART RATE: 90 BPM | OXYGEN SATURATION: 95 % | SYSTOLIC BLOOD PRESSURE: 155 MMHG | DIASTOLIC BLOOD PRESSURE: 82 MMHG | BODY MASS INDEX: 43.23 KG/M2

## 2018-11-06 DIAGNOSIS — R00.0 TACHYCARDIA: Primary | ICD-10-CM

## 2018-11-06 LAB
ALBUMIN SERPL BCP-MCNC: 3 G/DL (ref 3.5–5)
ALP SERPL-CCNC: 99 U/L (ref 46–116)
ALT SERPL W P-5'-P-CCNC: 25 U/L (ref 12–78)
ANION GAP SERPL CALCULATED.3IONS-SCNC: 11 MMOL/L (ref 4–13)
APTT PPP: 30 SECONDS (ref 24–36)
AST SERPL W P-5'-P-CCNC: 37 U/L (ref 5–45)
BASOPHILS # BLD AUTO: 0.02 THOUSANDS/ΜL (ref 0–0.1)
BASOPHILS NFR BLD AUTO: 0 % (ref 0–1)
BILIRUB SERPL-MCNC: 0.6 MG/DL (ref 0.2–1)
BUN SERPL-MCNC: 9 MG/DL (ref 5–25)
CALCIUM SERPL-MCNC: 8.9 MG/DL (ref 8.3–10.1)
CHLORIDE SERPL-SCNC: 102 MMOL/L (ref 100–108)
CO2 SERPL-SCNC: 22 MMOL/L (ref 21–32)
CREAT SERPL-MCNC: 0.52 MG/DL (ref 0.6–1.3)
DEPRECATED D DIMER PPP: 1680 NG/ML (FEU) (ref 190–520)
EOSINOPHIL # BLD AUTO: 0.07 THOUSAND/ΜL (ref 0–0.61)
EOSINOPHIL NFR BLD AUTO: 1 % (ref 0–6)
ERYTHROCYTE [DISTWIDTH] IN BLOOD BY AUTOMATED COUNT: 12.4 % (ref 11.6–15.1)
GFR SERPL CREATININE-BSD FRML MDRD: 101 ML/MIN/1.73SQ M
GLUCOSE SERPL-MCNC: 100 MG/DL (ref 65–140)
HCT VFR BLD AUTO: 44.5 % (ref 34.8–46.1)
HGB BLD-MCNC: 14.6 G/DL (ref 11.5–15.4)
IMM GRANULOCYTES # BLD AUTO: 0.03 THOUSAND/UL (ref 0–0.2)
IMM GRANULOCYTES NFR BLD AUTO: 0 % (ref 0–2)
INR PPP: 1.06 (ref 0.86–1.16)
LYMPHOCYTES # BLD AUTO: 1.25 THOUSANDS/ΜL (ref 0.6–4.47)
LYMPHOCYTES NFR BLD AUTO: 13 % (ref 14–44)
MCH RBC QN AUTO: 29.1 PG (ref 26.8–34.3)
MCHC RBC AUTO-ENTMCNC: 32.8 G/DL (ref 31.4–37.4)
MCV RBC AUTO: 89 FL (ref 82–98)
MONOCYTES # BLD AUTO: 1.16 THOUSAND/ΜL (ref 0.17–1.22)
MONOCYTES NFR BLD AUTO: 12 % (ref 4–12)
NEUTROPHILS # BLD AUTO: 6.95 THOUSANDS/ΜL (ref 1.85–7.62)
NEUTS SEG NFR BLD AUTO: 74 % (ref 43–75)
NRBC BLD AUTO-RTO: 0 /100 WBCS
PLATELET # BLD AUTO: 219 THOUSANDS/UL (ref 149–390)
PMV BLD AUTO: 8.5 FL (ref 8.9–12.7)
POTASSIUM SERPL-SCNC: 5.3 MMOL/L (ref 3.5–5.3)
PROT SERPL-MCNC: 8.2 G/DL (ref 6.4–8.2)
PROTHROMBIN TIME: 11.1 SECONDS (ref 9.4–11.7)
RBC # BLD AUTO: 5.02 MILLION/UL (ref 3.81–5.12)
SODIUM SERPL-SCNC: 135 MMOL/L (ref 136–145)
TROPONIN I SERPL-MCNC: <0.02 NG/ML
WBC # BLD AUTO: 9.48 THOUSAND/UL (ref 4.31–10.16)

## 2018-11-06 PROCEDURE — 85025 COMPLETE CBC W/AUTO DIFF WBC: CPT | Performed by: EMERGENCY MEDICINE

## 2018-11-06 PROCEDURE — 85379 FIBRIN DEGRADATION QUANT: CPT | Performed by: EMERGENCY MEDICINE

## 2018-11-06 PROCEDURE — 36415 COLL VENOUS BLD VENIPUNCTURE: CPT | Performed by: EMERGENCY MEDICINE

## 2018-11-06 PROCEDURE — 85610 PROTHROMBIN TIME: CPT | Performed by: EMERGENCY MEDICINE

## 2018-11-06 PROCEDURE — 96360 HYDRATION IV INFUSION INIT: CPT

## 2018-11-06 PROCEDURE — 80053 COMPREHEN METABOLIC PANEL: CPT | Performed by: EMERGENCY MEDICINE

## 2018-11-06 PROCEDURE — 71045 X-RAY EXAM CHEST 1 VIEW: CPT

## 2018-11-06 PROCEDURE — 71275 CT ANGIOGRAPHY CHEST: CPT

## 2018-11-06 PROCEDURE — 99285 EMERGENCY DEPT VISIT HI MDM: CPT

## 2018-11-06 PROCEDURE — 84484 ASSAY OF TROPONIN QUANT: CPT | Performed by: EMERGENCY MEDICINE

## 2018-11-06 PROCEDURE — 93005 ELECTROCARDIOGRAM TRACING: CPT

## 2018-11-06 PROCEDURE — 96361 HYDRATE IV INFUSION ADD-ON: CPT

## 2018-11-06 PROCEDURE — 85730 THROMBOPLASTIN TIME PARTIAL: CPT | Performed by: EMERGENCY MEDICINE

## 2018-11-06 RX ADMIN — IOHEXOL 85 ML: 350 INJECTION, SOLUTION INTRAVENOUS at 13:07

## 2018-11-06 RX ADMIN — SODIUM CHLORIDE 500 ML: 0.9 INJECTION, SOLUTION INTRAVENOUS at 12:01

## 2018-11-06 NOTE — ED NOTES
Pt resting in bed, no distress noted  Dr Dubose Friday made aware elevated D dimer and HR is 138s  Continue to monitor the pt       Abena Vila RN  11/06/18 1558

## 2018-11-06 NOTE — ED NOTES
Pt states that she has Hx of rapid HR about 17 years ago, cardiac cath attempted as per pt but was told her cardiac arteries were too small  Pt also has henrietta with Dr Krishna Ventura cardiologist since her PCP recently dx pt with heart murmur        Ela Larose, RN  11/06/18 1212

## 2018-11-06 NOTE — DISCHARGE INSTRUCTIONS
Tachycardia   WHAT YOU NEED TO KNOW:   Tachycardia (fast heart rate) is when your heart rate is 100 beats per minute or more at rest  It is normal for the heart rate to increase with activity or exercise and then decrease when you stop  A fast heart rate at rest may be caused by any of the following:  · Anxiety, stress, or pain    · Fever    · Physical fatigue or strenuous exercise    · Large amounts of caffeine such as coffee, tea, and energy drinks    · Heavy alcohol use or cigarette smoking    · Some medicines, inhalers, or street drugs    · Increased thyroid hormone level  DISCHARGE INSTRUCTIONS:   Call 911 or have someone else call for any of the following:   · You have any of the following signs of a heart attack:     ¨ Squeezing, pressure, or pain in your chest that lasts longer than a few minutes  Chest pain may come and go  ¨ Pain in your jaw, neck, one or both arms, upper and lower back, or stomach  ¨ Shortness of breath, or panting    ¨ Nausea or vomiting    ¨ Lightheadedness    · You cannot be woken  Contact your healthcare provider if:   · Your pulse is faster than your healthcare provider said it should be  · You have frequent periods of a fast heart rate  · You feel weak or dizzy  · You have questions or concerns about your condition or care  Help prevent a fast heart rate:  · Decrease the amount of caffeine you drink  Caffeine can increase your heart rate  · Limit or do not drink alcohol  Alcohol can increase your heart rate  Ask your healthcare provider if it is safe for you to drink alcohol  Also ask how much is safe for you to drink  · Do not smoke  Nicotine and other chemicals in cigarettes can cause damage to your heart  Ask your healthcare provider for information if you currently smoke and need help to quit  E-cigarettes or smokeless tobacco still contain nicotine  Talk to your healthcare provider before you use these products  · Do not use illegal drugs  Drugs such as meth and cocaine can increase your heart rate  Talk to your healthcare provider if you use illegal drugs and want to quit  · Get more rest   Fatigue can cause your heart rate to increase  Ask your healthcare provider about the best exercise plan for you  · Learn ways to cope with stress  Stress, fear, and anxiety can cause a fast heart rate  Your healthcare provider may recommend relaxation techniques and deep breathing exercises  Your healthcare provider may recommend you talk to someone about your stress or anxiety, such as a counselor or a trusted friend  Check your pulse as directed: Your healthcare provider will show you how to check your pulse, and how often to check it  Write down how fast your pulse is and if it feels regular or like it is skipping beats  Also write down the activity you were doing if your heart rate is above 100  Bring the information with you to your follow-up appointment  Follow up with your healthcare provider as directed: You may need more tests  Write down your questions so you remember to ask them at your visit  © 2017 [address] Information is for End User's use only and may not be sold, redistributed or otherwise used for commercial purposes  All illustrations and images included in CareNotes® are the copyrighted property of [employer]  or Nahum Singh  The above information is an  only  It is not intended as medical advice for individual conditions or treatments  Talk to your doctor, nurse or pharmacist before following any medical regimen to see if it is safe and effective for you

## 2018-11-06 NOTE — ED PROVIDER NOTES
History  Chief Complaint   Patient presents with    Rapid Heart Rate     Pt has rapid HR 140s, was in [facility] for colonoscopy - denies any CP, no SOB, No palpataion  No Hx cardiac diseases     40-year-old female presents to the ED with rapid heart rate from the GI lab  Patient is awake and alert does have some heart rate in the 140s to 150s  Patient states she did have this before and had cardiac catheterization which was negative  Patient denies any chest pain or shortness of breath with this  She feels that she was probably dehydrated from her prep on the colonoscopy  No other complaints        History provided by:  Patient   used: No        Prior to Admission Medications   Prescriptions Last Dose Informant Patient Reported? Taking? Multiple Vitamins-Minerals (MULTI FOR HER PO) Past Week at Unknown time  Yes Yes   Sig: Take by mouth daily   aspirin (ECOTRIN LOW STRENGTH) 81 mg EC tablet Past Week at Unknown time Self Yes Yes   Sig: Take 81 mg by mouth daily   atorvastatin (LIPITOR) 10 mg tablet Past Week at Unknown time  No Yes   Sig: Take 1 tablet (10 mg total) by mouth daily      Facility-Administered Medications: None       Past Medical History:   Diagnosis Date    Colon polyp     Hyperlipidemia     Murmur, heart        Past Surgical History:   Procedure Laterality Date    APPENDECTOMY      CHOLECYSTECTOMY      [facility]OSCOPY  2012    dr [facility]    HYSTERECTOMY         Family History   Problem Relation Age of Onset    Heart disease Mother         Cardiac Disorder     Hypothyroidism Sister     Diabetes Brother     Substance Abuse Neg Hx     Mental illness Neg Hx      I have reviewed and agree with the history as documented      Social History   Substance Use Topics    Smoking status: Former Smoker     Quit date: 10/31/2018    Smokeless tobacco: Never Used    Alcohol use Yes      Comment: rare        Review of Systems   Constitutional: Negative for activity change, chills, diaphoresis and fever  HENT: Negative for congestion, ear pain, nosebleeds, sore throat, trouble swallowing and voice change  Eyes: Negative for pain, discharge and redness  Respiratory: Negative for apnea, cough, choking, shortness of breath, wheezing and stridor  Cardiovascular: Positive for palpitations  Negative for chest pain  Gastrointestinal: Negative for abdominal distention, abdominal pain, constipation, diarrhea, nausea and vomiting  Endocrine: Negative for polydipsia  Genitourinary: Negative for difficulty urinating, dysuria, flank pain, frequency, hematuria and urgency  Musculoskeletal: Negative for back pain, gait problem, joint swelling, myalgias, neck pain and neck stiffness  Skin: Negative for pallor and rash  Neurological: Negative for dizziness, tremors, syncope, speech difficulty, weakness, numbness and headaches  Hematological: Negative for adenopathy  Psychiatric/Behavioral: Negative for confusion, hallucinations, self-injury and suicidal ideas  The patient is not nervous/anxious  Physical Exam  Physical Exam   Constitutional: Vital signs are normal  She appears well-developed and well-nourished  HENT:   Head: Normocephalic and atraumatic  Right Ear: External ear normal    Left Ear: External ear normal    Nose: Nose normal    Mouth/Throat: Oropharynx is clear and moist    Eyes: Pupils are equal, round, and reactive to light  Conjunctivae and EOM are normal    Neck: Normal range of motion  Neck supple  Cardiovascular:   Patient tachycardic to 140   Pulmonary/Chest: Effort normal and breath sounds normal    Abdominal: Soft  Bowel sounds are normal    Musculoskeletal: Normal range of motion  Neurological: She is alert  Skin: Skin is warm  Psychiatric: She has a normal mood and affect  Nursing note and vitals reviewed        Vital Signs  ED Triage Vitals   Temperature Pulse Respirations Blood Pressure SpO2   11/06/18 1139 11/06/18 1139 11/06/18 1139 11/06/18 1144 11/06/18 1144   98 4 °F (36 9 °C) (!) 143 22 (!) 157/109 96 %      Temp Source Heart Rate Source Patient Position - Orthostatic VS BP Location FiO2 (%)   11/06/18 1139 11/06/18 1139 11/06/18 1139 -- --   Oral Monitor Sitting        Pain Score       11/06/18 1139       No Pain           Vitals:    11/06/18 1300 11/06/18 1315 11/06/18 1345 11/06/18 1400   BP: (!) 180/125 (!) 191/94  157/86   Pulse: (!) 138 (!) 140 92 92   Patient Position - Orthostatic VS:           Visual Acuity      ED Medications  Medications   sodium chloride 0 9 % bolus 500 mL (0 mL Intravenous Stopped 11/6/18 1405)   iohexol (OMNIPAQUE) 350 MG/ML injection (MULTI-DOSE) 85 mL (85 mL Intravenous Given 11/6/18 1307)       Diagnostic Studies  Results Reviewed     Procedure Component Value Units Date/Time    D-Dimer [[id number]]  (Abnormal) Collected:  11/06/18 1159    Lab Status:  Final result Specimen:  Blood from Arm, Left Updated:  11/06/18 1227     D-Dimer, Quant 1,680 (H) ng/ml (FEU)     Protime-INR [[id number]]  (Normal) Collected:  11/06/18 1159    Lab Status:  Final result Specimen:  Blood from Arm, Left Updated:  11/06/18 1227     Protime 11 1 seconds      INR 1 06    APTT [[id number]]  (Normal) Collected:  11/06/18 1159    Lab Status:  Final result Specimen:  Blood from Arm, Left Updated:  11/06/18 1227     PTT 30 seconds     Comprehensive metabolic panel [[id number]]  (Abnormal) Collected:  11/06/18 1159    Lab Status:  Final result Specimen:  Blood from Arm, Left Updated:  11/06/18 1226     Sodium 135 (L) mmol/L      Potassium 5 3 mmol/L      Chloride 102 mmol/L      CO2 22 mmol/L      ANION GAP 11 mmol/L      BUN 9 mg/dL      Creatinine 0 52 (L) mg/dL      Glucose 100 mg/dL      Calcium 8 9 mg/dL      AST 37 U/L      ALT 25 U/L      Alkaline Phosphatase 99 U/L      Total Protein 8 2 g/dL      Albumin 3 0 (L) g/dL      Total Bilirubin 0 60 mg/dL      eGFR 101 ml/min/1 73sq m     Narrative:         National Kidney Disease Education Program recommendations are as follows:  GFR calculation is accurate only with a steady state creatinine  Chronic Kidney disease less than 60 ml/min/1 73 sq  meters  Kidney failure less than 15 ml/min/1 73 sq  meters  Troponin I [[id number]]  (Normal) Collected:  11/06/18 1159    Lab Status:  Final result Specimen:  Blood from Arm, Left Updated:  11/06/18 1224     Troponin I <0 02 ng/mL     CBC and differential [[id number]]  (Abnormal) Collected:  11/06/18 1159    Lab Status:  Final result Specimen:  Blood from Arm, Left Updated:  11/06/18 1207     WBC 9 48 Thousand/uL      RBC 5 02 Million/uL      Hemoglobin 14 6 g/dL      Hematocrit 44 5 %      MCV 89 fL      MCH 29 1 pg      MCHC 32 8 g/dL      RDW 12 4 %      MPV 8 5 (L) fL      Platelets 176 Thousands/uL      nRBC 0 /100 WBCs      Neutrophils Relative 74 %      Immat GRANS % 0 %      Lymphocytes Relative 13 (L) %      Monocytes Relative 12 %      Eosinophils Relative 1 %      Basophils Relative 0 %      Neutrophils Absolute 6 95 Thousands/µL      Immature Grans Absolute 0 03 Thousand/uL      Lymphocytes Absolute 1 25 Thousands/µL      Monocytes Absolute 1 16 Thousand/µL      Eosinophils Absolute 0 07 Thousand/µL      Basophils Absolute 0 02 Thousands/µL                  CTA ED chest PE study   Final Result by Shay Piedra MD (11/06 5859)   Limited study due to suboptimal contrast enhancement  There is no large occlusive pulmonary embolus seen in the main pulmonary artery   Evaluation for lobar, segmental and subsegmental vessels limited      No acute consolidation seen   Need for additional imaging with repeat scanning to be based on clinical evaluation               Workstation performed: [id number]         XR chest 1 view portable   Final Result by Jackelyn Saldivar MD (11/06 1231)      No acute cardiopulmonary disease              Workstation performed: [id number]                    Procedures  Procedures       Phone Contacts  ED Phone Contact    ED Course                               MDM  Number of Diagnoses or Management Options  Diagnosis management comments: Patient's repeat EKG is normal sinus rhythm rate at 95 blood pressure is also normalized this all occurred shortly after she received 500 cc fluid bolus  Patient states she is feels fine and would like to go home    CritCare Time    Disposition  Final diagnoses:   Tachycardia     Time reflects when diagnosis was documented in both MDM as applicable and the Disposition within this note     Time User Action Codes Description Comment    11/6/2018  2:32 PM Andrés Paige Add [R00 0] Tachycardia       ED Disposition     ED Disposition Condition Comment    Discharge  Duyen Ireland discharge to home/self care  Condition at discharge: Stable        Follow-up Information     Follow up With Specialties Details Why Contact Anita Sethi DO Family Medicine Schedule an appointment as soon as possible for a visit As needed [address] [facility]  [phone]            Patient's Medications   Discharge Prescriptions    No medications on file     No discharge procedures on file      ED Provider  Electronically Signed by           Carlotta Frazier DO  11/06/18 4739

## 2018-11-06 NOTE — ED NOTES
As per  chamber is ok to give water to the pt  Ice water given as requested  Dr Rashad Caceres is notified that HR is 99-97s  Verbal order for repeated EKG obtained        Dioni Corcoran RN  11/06/18 5942

## 2018-11-07 LAB
ATRIAL RATE: 141 BPM
ATRIAL RATE: 95 BPM
P AXIS: 67 DEGREES
PR INTERVAL: 178 MS
QRS AXIS: 20 DEGREES
QRS AXIS: 28 DEGREES
QRSD INTERVAL: 82 MS
QRSD INTERVAL: 86 MS
QT INTERVAL: 352 MS
QT INTERVAL: 356 MS
QTC INTERVAL: 447 MS
QTC INTERVAL: 539 MS
T WAVE AXIS: 34 DEGREES
T WAVE AXIS: 8 DEGREES
VENTRICULAR RATE: 141 BPM
VENTRICULAR RATE: 95 BPM

## 2018-11-07 PROCEDURE — 93010 ELECTROCARDIOGRAM REPORT: CPT | Performed by: INTERNAL MEDICINE

## 2018-12-27 PROBLEM — I47.1 SVT (SUPRAVENTRICULAR TACHYCARDIA) (HCC): Status: ACTIVE | Noted: 2018-12-27

## 2018-12-27 PROBLEM — I25.10 CORONARY ARTERY DISEASE INVOLVING NATIVE CORONARY ARTERY OF NATIVE HEART WITHOUT ANGINA PECTORIS: Status: ACTIVE | Noted: 2018-12-27

## 2019-03-21 ENCOUNTER — TELEPHONE (OUTPATIENT)
Dept: BARIATRICS | Facility: CLINIC | Age: 64
End: 2019-03-21

## 2019-04-11 ENCOUNTER — TELEPHONE (OUTPATIENT)
Dept: BARIATRICS | Facility: CLINIC | Age: 64
End: 2019-04-11

## 2019-04-11 PROBLEM — E66.01 OBESITY, CLASS III, BMI 40-49.9 (MORBID OBESITY) (HCC): Status: ACTIVE | Noted: 2019-04-11

## 2019-04-30 ENCOUNTER — OFFICE VISIT (OUTPATIENT)
Dept: BARIATRICS | Facility: CLINIC | Age: 64
End: 2019-04-30
Payer: COMMERCIAL

## 2019-04-30 VITALS
WEIGHT: 238.38 LBS | TEMPERATURE: 99.5 F | HEART RATE: 85 BPM | RESPIRATION RATE: 16 BRPM | BODY MASS INDEX: 45.01 KG/M2 | HEIGHT: 61 IN | DIASTOLIC BLOOD PRESSURE: 80 MMHG | SYSTOLIC BLOOD PRESSURE: 138 MMHG

## 2019-04-30 DIAGNOSIS — E66.01 OBESITY, CLASS III, BMI 40-49.9 (MORBID OBESITY) (HCC): Primary | ICD-10-CM

## 2019-04-30 DIAGNOSIS — E78.2 MIXED HYPERLIPIDEMIA: ICD-10-CM

## 2019-04-30 DIAGNOSIS — I10 ESSENTIAL HYPERTENSION: ICD-10-CM

## 2019-04-30 PROCEDURE — 99204 OFFICE O/P NEW MOD 45 MIN: CPT | Performed by: PHYSICIAN ASSISTANT

## 2019-04-30 RX ORDER — AMLODIPINE BESYLATE 5 MG/1
5 TABLET ORAL DAILY
Refills: 2 | COMMUNITY
Start: 2019-04-10 | End: 2019-09-23 | Stop reason: SDUPTHER

## 2019-05-08 ENCOUNTER — OFFICE VISIT (OUTPATIENT)
Dept: BARIATRICS | Facility: CLINIC | Age: 64
End: 2019-05-08

## 2019-05-08 VITALS — BODY MASS INDEX: 44.69 KG/M2 | WEIGHT: 236.7 LBS | HEIGHT: 61 IN

## 2019-05-08 DIAGNOSIS — R63.5 ABNORMAL WEIGHT GAIN: ICD-10-CM

## 2019-05-08 PROCEDURE — RECHECK

## 2019-05-08 PROCEDURE — VLCD

## 2019-05-10 ENCOUNTER — PATIENT OUTREACH (OUTPATIENT)
Dept: BARIATRICS | Facility: CLINIC | Age: 64
End: 2019-05-10

## 2019-05-22 ENCOUNTER — OFFICE VISIT (OUTPATIENT)
Dept: BARIATRICS | Facility: CLINIC | Age: 64
End: 2019-05-22

## 2019-05-22 VITALS — BODY MASS INDEX: 43.02 KG/M2 | WEIGHT: 227.7 LBS

## 2019-05-22 DIAGNOSIS — R63.5 ABNORMAL WEIGHT GAIN: Primary | ICD-10-CM

## 2019-05-22 PROCEDURE — VLCD

## 2019-05-22 PROCEDURE — RECHECK

## 2019-06-06 ENCOUNTER — OFFICE VISIT (OUTPATIENT)
Dept: BARIATRICS | Facility: CLINIC | Age: 64
End: 2019-06-06

## 2019-06-06 VITALS — DIASTOLIC BLOOD PRESSURE: 81 MMHG | SYSTOLIC BLOOD PRESSURE: 137 MMHG | BODY MASS INDEX: 42.48 KG/M2 | WEIGHT: 224.8 LBS

## 2019-06-06 DIAGNOSIS — R63.5 ABNORMAL WEIGHT GAIN: ICD-10-CM

## 2019-06-06 PROCEDURE — RECHECK

## 2019-06-06 PROCEDURE — VLCD

## 2019-06-07 LAB
BUN SERPL-MCNC: 16 MG/DL (ref 8–27)
BUN/CREAT SERPL: 29 (ref 12–28)
CALCIUM SERPL-MCNC: 9.4 MG/DL (ref 8.7–10.3)
CHLORIDE SERPL-SCNC: 103 MMOL/L (ref 96–106)
CO2 SERPL-SCNC: 22 MMOL/L (ref 20–29)
CREAT SERPL-MCNC: 0.56 MG/DL (ref 0.57–1)
GLUCOSE SERPL-MCNC: 91 MG/DL (ref 65–99)
LABCORP COMMENT: NORMAL
MAGNESIUM SERPL-MCNC: 2.2 MG/DL (ref 1.6–2.3)
POTASSIUM SERPL-SCNC: 4.5 MMOL/L (ref 3.5–5.2)
SL AMB EGFR AFRICAN AMERICAN: 112 ML/MIN/1.73
SL AMB EGFR NON AFRICAN AMERICAN: 97 ML/MIN/1.73
SODIUM SERPL-SCNC: 140 MMOL/L (ref 134–144)

## 2019-06-11 ENCOUNTER — TELEPHONE (OUTPATIENT)
Dept: BARIATRICS | Facility: CLINIC | Age: 64
End: 2019-06-11

## 2019-06-19 ENCOUNTER — TELEPHONE (OUTPATIENT)
Dept: BARIATRICS | Facility: CLINIC | Age: 64
End: 2019-06-19

## 2019-06-26 ENCOUNTER — OFFICE VISIT (OUTPATIENT)
Dept: BARIATRICS | Facility: CLINIC | Age: 64
End: 2019-06-26
Payer: COMMERCIAL

## 2019-06-26 VITALS
RESPIRATION RATE: 16 BRPM | WEIGHT: 221.13 LBS | TEMPERATURE: 99.3 F | HEIGHT: 61 IN | DIASTOLIC BLOOD PRESSURE: 76 MMHG | HEART RATE: 95 BPM | SYSTOLIC BLOOD PRESSURE: 124 MMHG | BODY MASS INDEX: 41.75 KG/M2

## 2019-06-26 DIAGNOSIS — I10 ESSENTIAL HYPERTENSION: ICD-10-CM

## 2019-06-26 DIAGNOSIS — E66.01 OBESITY, CLASS III, BMI 40-49.9 (MORBID OBESITY) (HCC): Primary | ICD-10-CM

## 2019-06-26 DIAGNOSIS — R63.5 ABNORMAL WEIGHT GAIN: ICD-10-CM

## 2019-06-26 PROCEDURE — 99214 OFFICE O/P EST MOD 30 MIN: CPT | Performed by: PHYSICIAN ASSISTANT

## 2019-07-10 ENCOUNTER — TELEPHONE (OUTPATIENT)
Dept: BARIATRICS | Facility: CLINIC | Age: 64
End: 2019-07-10

## 2019-09-23 ENCOUNTER — OFFICE VISIT (OUTPATIENT)
Dept: FAMILY MEDICINE CLINIC | Facility: CLINIC | Age: 64
End: 2019-09-23
Payer: COMMERCIAL

## 2019-09-23 VITALS
HEIGHT: 61 IN | WEIGHT: 219 LBS | BODY MASS INDEX: 41.35 KG/M2 | SYSTOLIC BLOOD PRESSURE: 128 MMHG | TEMPERATURE: 97.6 F | RESPIRATION RATE: 18 BRPM | HEART RATE: 84 BPM | DIASTOLIC BLOOD PRESSURE: 72 MMHG

## 2019-09-23 DIAGNOSIS — Z13.29 SCREENING FOR THYROID DISORDER: ICD-10-CM

## 2019-09-23 DIAGNOSIS — Z13.6 SCREENING FOR CARDIOVASCULAR CONDITION: ICD-10-CM

## 2019-09-23 DIAGNOSIS — Z12.31 ENCOUNTER FOR SCREENING MAMMOGRAM FOR MALIGNANT NEOPLASM OF BREAST: ICD-10-CM

## 2019-09-23 DIAGNOSIS — Z82.49 FAMILY HISTORY OF HEART DISEASE: ICD-10-CM

## 2019-09-23 DIAGNOSIS — Z00.00 ROUTINE ADULT HEALTH MAINTENANCE: Primary | ICD-10-CM

## 2019-09-23 DIAGNOSIS — Z13.1 SCREENING FOR DIABETES MELLITUS: ICD-10-CM

## 2019-09-23 DIAGNOSIS — Z11.59 NEED FOR HEPATITIS C SCREENING TEST: ICD-10-CM

## 2019-09-23 DIAGNOSIS — I10 ESSENTIAL HYPERTENSION: ICD-10-CM

## 2019-09-23 DIAGNOSIS — E78.2 MIXED HYPERLIPIDEMIA: ICD-10-CM

## 2019-09-23 DIAGNOSIS — N95.1 MENOPAUSAL STATE: ICD-10-CM

## 2019-09-23 PROCEDURE — 99397 PER PM REEVAL EST PAT 65+ YR: CPT | Performed by: NURSE PRACTITIONER

## 2019-09-23 RX ORDER — AMLODIPINE BESYLATE 5 MG/1
5 TABLET ORAL DAILY
Qty: 90 TABLET | Refills: 3 | Status: SHIPPED | OUTPATIENT
Start: 2019-09-23 | End: 2020-01-03 | Stop reason: SDUPTHER

## 2019-09-23 RX ORDER — ATORVASTATIN CALCIUM 10 MG/1
10 TABLET, FILM COATED ORAL DAILY
Qty: 90 TABLET | Refills: 3 | Status: SHIPPED | OUTPATIENT
Start: 2019-09-23 | End: 2020-01-03 | Stop reason: SDUPTHER

## 2019-09-23 NOTE — PROGRESS NOTES
FAMILY PRACTICE HEALTH MAINTENANCE OFFICE VISIT  [facility]    NAME: Alysia Conception  AGE: 75 y o  SEX: female  : 1955     DATE: 2019    Assessment and Plan     1  Routine adult health maintenance    2  Encounter for screening mammogram for malignant neoplasm of breast  -     Mammo screening bilateral w cad; Future; Expected date: 2019    3  Screening for cardiovascular condition    4  Screening for thyroid disorder  -     TSH, 3rd generation with Free T4 reflex; Future    5  Screening for diabetes mellitus  -     Hemoglobin A1C; Future    6  Mixed hyperlipidemia  Assessment & Plan:  Due for labs  Orders:  -     CBC and differential; Future  -     Comprehensive metabolic panel; Future  -     LIPID PANEL + LDL/HDL RATIO; Future  -     atorvastatin (LIPITOR) 10 mg tablet; Take 1 tablet (10 mg total) by mouth daily  -     CT coronary calcium score; Future; Expected date: 2019    7  Menopausal state  -     DXA bone density spine hip and pelvis; Future; Expected date: 2019    8  Family history of heart disease  -     CT coronary calcium score; Future; Expected date: 2019    9  Essential hypertension  Assessment & Plan:  Stable on Amlodipine  Continue same    Orders:  -     amLODIPine (NORVASC) 5 mg tablet; Take 1 tablet (5 mg total) by mouth daily    10  Need for hepatitis C screening test  -     Hepatitis C antibody; Future          · Patient Counseling:   · Nutrition: Stressed importance of a well balanced diet, moderation of sodium/saturated fat, caloric balance and sufficient intake of fiber  · Exercise: Stressed the importance of regular exercise with a goal of 150 minutes per week  · Dental Health: Discussed daily flossing and brushing and regular dental visits     · Immunizations reviewed: due for Pneumovax 23, flu, and Shingrix  Vaccines unvailable to give in the office today    she will schedule a nurse visit for immunizations  · Discussed benefits of:  Colon Cancer Screening and Mammogram     BMI Counseling: Body mass index is 41 38 kg/m²  Discussed with patient's BMI with her  The BMI is above normal  Exercise recommendations include moderate aerobic physical activity for 150 minutes/week  Return for Annual physical         Chief Complaint     Chief Complaint   Patient presents with    Physical Exam     [id number]       History of Present Illness     Here today for CPE  Overdue for colonoscopy  Went in the fall and her BP was too high  Requesting order for CT coronary calcium score  Offers no complaints or concerns      Well Adult Physical   Patient here for a comprehensive physical exam       Diet and Physical Activity  Diet: well balanced diet  Exercise: infrequently      Depression Screen  PHQ-9 Depression Screening    PHQ-9:    Frequency of the following problems over the past two weeks:       Little interest or pleasure in doing things:  0 - not at all  Feeling down, depressed, or hopeless:  0 - not at all  PHQ-2 Score:  0          General Health  Hearing: Normal:  bilateral  Vision: goes for regular eye exams and most recent eye exam <1 year  Dental: regular dental visits and brushes teeth twice daily    Reproductive Health  Post-menopausal       The following portions of the patient's history were reviewed and updated as appropriate: allergies, current medications, past family history, past medical history, past social history, past surgical history and problem list     Review of Systems     Review of Systems   Constitutional: Negative  Respiratory: Negative for cough and shortness of breath  Cardiovascular: Negative for chest pain  Gastrointestinal: Negative for abdominal pain, diarrhea, nausea and vomiting  Neurological: Negative for dizziness and headaches         Past Medical History     Past Medical History:   Diagnosis Date    Colon polyp     Hyperlipidemia     Hypertension     Murmur, heart        Past Surgical History     Past Surgical History:   Procedure Laterality Date    APPENDECTOMY      CHOLECYSTECTOMY      COLONOSCOPY  2012    dr rich [facility]    HYSTERECTOMY         Social History     Social History     Socioeconomic History    Marital status:       Spouse name: None    Number of children: None    Years of education: None    Highest education level: None   Occupational History    None   Social Needs    Financial resource strain: None    Food insecurity:     Worry: None     Inability: None    Transportation needs:     Medical: None     Non-medical: None   Tobacco Use    Smoking status: Former Smoker     Last attempt to quit: 10/31/2018     Years since quittin 8    Smokeless tobacco: Never Used   Substance and Sexual Activity    Alcohol use: Yes     Frequency: 2-4 times a month     Drinks per session: 1 or 2     Binge frequency: Never     Comment: rare    Drug use: No    Sexual activity: None   Lifestyle    Physical activity:     Days per week: None     Minutes per session: None    Stress: None   Relationships    Social connections:     Talks on phone: None     Gets together: None     Attends Sabianism service: None     Active member of club or organization: None     Attends meetings of clubs or organizations: None     Relationship status: None    Intimate partner violence:     Fear of current or ex partner: None     Emotionally abused: None     Physically abused: None     Forced sexual activity: None   Other Topics Concern    None   Social History Narrative    None       Family History     Family History   Problem Relation Age of Onset    Heart disease Mother         Cardiac Disorder     Cancer Mother         uterine    Hypothyroidism Sister     Hypertension Sister     Diabetes Brother     Hypertension Brother     Heart attack Brother     Colon cancer Brother     Heart attack Father     Heart disease Father     Diabetes Maternal Aunt  Diabetes Maternal Uncle     Substance Abuse Neg Hx     Mental illness Neg Hx     Stroke Neg Hx        Current Medications       Current Outpatient Medications:     amLODIPine (NORVASC) 5 mg tablet, Take 1 tablet (5 mg total) by mouth daily, Disp: 90 tablet, Rfl: 3    atorvastatin (LIPITOR) 10 mg tablet, Take 1 tablet (10 mg total) by mouth daily, Disp: 90 tablet, Rfl: 3    Multiple Vitamins-Minerals (MULTI FOR HER PO), Take by mouth daily, Disp: , Rfl:      Allergies     Allergies   Allergen Reactions    Cortisone      Reaction Date: 25Jul2012; Annotation - [id number]: swelling       Objective     /72   Pulse 84   Temp 97 6 °F (36 4 °C)   Resp 18   Ht 5' 1" (1 549 m)   Wt 99 3 kg (219 lb)   BMI 41 38 kg/m²      Physical Exam   Constitutional: She is oriented to person, place, and time  She appears well-developed and well-nourished  HENT:   Head: Normocephalic  Right Ear: External ear normal    Left Ear: External ear normal    Nose: Nose normal    Mouth/Throat: Oropharynx is clear and moist    Eyes: Pupils are equal, round, and reactive to light  Conjunctivae and EOM are normal    Neck: Neck supple  No thyromegaly present  Cardiovascular: Normal rate, regular rhythm, normal heart sounds and intact distal pulses  No murmur heard  Pulmonary/Chest: Effort normal and breath sounds normal    Declines breast exam   Abdominal: Soft  Bowel sounds are normal  She exhibits no distension  Musculoskeletal: Normal range of motion  She exhibits no edema  Lymphadenopathy:     She has no cervical adenopathy  Neurological: She is alert and oriented to person, place, and time  She has normal reflexes  Skin: Skin is warm, dry and intact  Psychiatric: She has a normal mood and affect  Nursing note and vitals reviewed          No exam data present        [facility], [facility]

## 2019-09-23 NOTE — PATIENT INSTRUCTIONS
Please schedule an appointment for your vaccines: you are due for flu, Pneumovax 23, and Shingrix          Wellness Visit for Adults   AMBULATORY CARE:   A wellness visit  is when you see your healthcare provider to get screened for health problems  You can also get advice on how to stay healthy  Write down your questions so you remember to ask them  Ask your healthcare provider how often you should have a wellness visit  What happens at a wellness visit:  Your healthcare provider will ask about your health, and your family history of health problems  This includes high blood pressure, heart disease, and cancer  He or she will ask if you have symptoms that concern you, if you smoke, and about your mood  You may also be asked about your intake of medicines, supplements, food, and alcohol  Any of the following may be done:  · Your weight  will be checked  Your height may also be checked so your body mass index (BMI) can be calculated  Your BMI shows if you are at a healthy weight  · Your blood pressure  and heart rate will be checked  Your temperature may also be checked  · Blood and urine tests  may be done  Blood tests may be done to check your cholesterol levels  Abnormal cholesterol levels increase your risk for heart disease and stroke  You may also need a blood or urine test to check for diabetes if you are at increased risk  Urine tests may be done to look for signs of an infection or kidney disease  · A physical exam  includes checking your heartbeat and lungs with a stethoscope  Your healthcare provider may also check your skin to look for sun damage  · Screening tests  may be recommended  A screening test is done to check for diseases that may not cause symptoms  The screening tests you may need depend on your age, gender, family history, and lifestyle habits  For example, colorectal screening may be recommended if you are 48years old or older    Screening tests you need if you are a woman: · A Pap smear  is used to screen for cervical cancer  Pap smears are usually done every 3 to 5 years depending on your age  You may need them more often if you have had abnormal Pap smear test results in the past  Ask your healthcare provider how often you should have a Pap smear  · A mammogram  is an x-ray of your breasts to screen for breast cancer  Experts recommend mammograms every 2 years starting at age 48 years  You may need a mammogram at age 52 years or younger if you have an increased risk for breast cancer  Talk to your healthcare provider about when you should start having mammograms and how often you need them  Vaccines you may need:   · Get an influenza vaccine  every year  The influenza vaccine protects you from the flu  Several types of viruses cause the flu  The viruses change over time, so new vaccines are made each year  · Get a tetanus-diphtheria (Td) booster vaccine  every 10 years  This vaccine protects you against tetanus and diphtheria  Tetanus is a severe infection that may cause painful muscle spasms and lockjaw  Diphtheria is a severe bacterial infection that causes a thick covering in the back of your mouth and throat  · Get a human papillomavirus (HPV) vaccine  if you are female and aged 23 to 32 or male 23 to 24 and never received it  This vaccine protects you from HPV infection  HPV is the most common infection spread by sexual contact  HPV may also cause vaginal, penile, and anal cancers  · Get a pneumococcal vaccine  if you are aged 72 years or older  The pneumococcal vaccine is an injection given to protect you from pneumococcal disease  Pneumococcal disease is an infection caused by pneumococcal bacteria  The infection may cause pneumonia, meningitis, or an ear infection  · Get a shingles vaccine  if you are aged 61 or older, even if you have had shingles before  The shingles vaccine is an injection to protect you from the varicella-zoster virus   This is the same virus that causes chickenpox  Shingles is a painful rash that develops in people who had chickenpox or have been exposed to the virus  How to eat healthy:  My Plate is a model for planning healthy meals  It shows the types and amounts of foods that should go on your plate  Fruits and vegetables make up about half of your plate, and grains and protein make up the other half  A serving of dairy is included on the side of your plate  The amount of calories and serving sizes you need depends on your age, gender, weight, and height  Examples of healthy foods are listed below:  · Eat a variety of vegetables  such as dark green, red, and orange vegetables  You can also include canned vegetables low in sodium (salt) and frozen vegetables without added butter or sauces  · Eat a variety of fresh fruits , canned fruit in 100% juice, frozen fruit, and dried fruit  · Include whole grains  At least half of the grains you eat should be whole grains  Examples include whole-wheat bread, wheat pasta, brown rice, and whole-grain cereals such as oatmeal     · Eat a variety of protein foods such as seafood (fish and shellfish), lean meat, and poultry without skin (turkey and chicken)  Examples of lean meats include pork leg, shoulder, or tenderloin, and beef round, sirloin, tenderloin, and extra lean ground beef  Other protein foods include eggs and egg substitutes, beans, peas, soy products, nuts, and seeds  · Choose low-fat dairy products such as skim or 1% milk or low-fat yogurt, cheese, and cottage cheese  · Limit unhealthy fats  such as butter, hard margarine, and shortening  Exercise:  Exercise at least 30 minutes per day on most days of the week  Some examples of exercise include walking, biking, dancing, and swimming  You can also fit in more physical activity by taking the stairs instead of the elevator or parking farther away from stores  Include muscle strengthening activities 2 days each week  Regular exercise provides many health benefits  It helps you manage your weight, and decreases your risk for type 2 diabetes, heart disease, stroke, and high blood pressure  Exercise can also help improve your mood  Ask your healthcare provider about the best exercise plan for you  General health and safety guidelines:   · Do not smoke  Nicotine and other chemicals in cigarettes and cigars can cause lung damage  Ask your healthcare provider for information if you currently smoke and need help to quit  E-cigarettes or smokeless tobacco still contain nicotine  Talk to your healthcare provider before you use these products  · Limit alcohol  A drink of alcohol is 12 ounces of beer, 5 ounces of wine, or 1½ ounces of liquor  · Lose weight, if needed  Being overweight increases your risk of certain health conditions  These include heart disease, high blood pressure, type 2 diabetes, and certain types of cancer  · Protect your skin  Do not sunbathe or use tanning beds  Use sunscreen with a SPF 15 or higher  Apply sunscreen at least 15 minutes before you go outside  Reapply sunscreen every 2 hours  Wear protective clothing, hats, and sunglasses when you are outside  · Drive safely  Always wear your seatbelt  Make sure everyone in your car wears a seatbelt  A seatbelt can save your life if you are in an accident  Do not use your cell phone when you are driving  This could distract you and cause an accident  Pull over if you need to make a call or send a text message  · Practice safe sex  Use latex condoms if are sexually active and have more than one partner  Your healthcare provider may recommend screening tests for sexually transmitted infections (STIs)  · Wear helmets, lifejackets, and protective gear  Always wear a helmet when you ride a bike or motorcycle, go skiing, or play sports that could cause a head injury  Wear protective equipment when you play sports   Wear a lifejacket when you are on a boat or doing water sports  © 2017 [employer] [facility] Information is for End User's use only and may not be sold, redistributed or otherwise used for commercial purposes  All illustrations and images included in CareNotes® are the copyrighted property of A D A M , Inc  or Nahum Singh  The above information is an  only  It is not intended as medical advice for individual conditions or treatments  Talk to your doctor, nurse or pharmacist before following any medical regimen to see if it is safe and effective for you

## 2019-09-30 ENCOUNTER — HOSPITAL ENCOUNTER (OUTPATIENT)
Dept: CT IMAGING | Facility: HOSPITAL | Age: 64
Discharge: HOME | End: 2019-09-30
Payer: COMMERCIAL

## 2019-09-30 DIAGNOSIS — E78.2 MIXED HYPERLIPIDEMIA: ICD-10-CM

## 2019-09-30 DIAGNOSIS — Z82.49 FAMILY HISTORY OF HEART DISEASE: ICD-10-CM

## 2019-10-02 ENCOUNTER — TELEPHONE (OUTPATIENT)
Dept: FAMILY MEDICINE CLINIC | Facility: CLINIC | Age: 64
End: 2019-10-02

## 2019-10-02 NOTE — TELEPHONE ENCOUNTER
L/M for pt regarding CT coronary calcium score informing her that she will need to go for her labs in order for me to calculate her scoring  I did advise her to call the office with any questions  Will f/u with results of labs and CT coronary calcium score once this is completed   Rianna Bejarano

## 2019-10-09 PROBLEM — R73.01 IMPAIRED FASTING GLUCOSE: Status: ACTIVE | Noted: 2019-10-09

## 2019-10-09 LAB
ALBUMIN SERPL-MCNC: 4.2 G/DL (ref 3.6–4.8)
ALBUMIN/GLOB SERPL: 1.4 {RATIO} (ref 1.2–2.2)
ALP SERPL-CCNC: 99 IU/L (ref 39–117)
ALT SERPL-CCNC: 12 IU/L (ref 0–32)
AST SERPL-CCNC: 13 IU/L (ref 0–40)
BASOPHILS # BLD AUTO: 0 X10E3/UL (ref 0–0.2)
BASOPHILS NFR BLD AUTO: 0 %
BILIRUB SERPL-MCNC: 0.2 MG/DL (ref 0–1.2)
BUN SERPL-MCNC: 16 MG/DL (ref 8–27)
BUN/CREAT SERPL: 25 (ref 12–28)
CALCIUM SERPL-MCNC: 9.8 MG/DL (ref 8.7–10.3)
CHLORIDE SERPL-SCNC: 105 MMOL/L (ref 96–106)
CHOLEST SERPL-MCNC: 197 MG/DL (ref 100–199)
CO2 SERPL-SCNC: 24 MMOL/L (ref 20–29)
CREAT SERPL-MCNC: 0.63 MG/DL (ref 0.57–1)
EOSINOPHIL # BLD AUTO: 0.2 X10E3/UL (ref 0–0.4)
EOSINOPHIL NFR BLD AUTO: 3 %
ERYTHROCYTE [DISTWIDTH] IN BLOOD BY AUTOMATED COUNT: 13.9 % (ref 12.3–15.4)
GLOBULIN SER-MCNC: 3.1 G/DL (ref 1.5–4.5)
GLUCOSE SERPL-MCNC: 98 MG/DL (ref 65–99)
HBA1C MFR BLD: 5.8 % (ref 4.8–5.6)
HCT VFR BLD AUTO: 42.6 % (ref 34–46.6)
HCV AB S/CO SERPL IA: 0.2 S/CO RATIO (ref 0–0.9)
HDLC SERPL-MCNC: 55 MG/DL
HGB BLD-MCNC: 14.5 G/DL (ref 11.1–15.9)
IMM GRANULOCYTES # BLD: 0 X10E3/UL (ref 0–0.1)
IMM GRANULOCYTES NFR BLD: 0 %
LDLC SERPL CALC-MCNC: 126 MG/DL (ref 0–99)
LDLC/HDLC SERPL: 2.3 RATIO (ref 0–3.2)
LYMPHOCYTES # BLD AUTO: 1.9 X10E3/UL (ref 0.7–3.1)
LYMPHOCYTES NFR BLD AUTO: 27 %
MCH RBC QN AUTO: 29.6 PG (ref 26.6–33)
MCHC RBC AUTO-ENTMCNC: 34 G/DL (ref 31.5–35.7)
MCV RBC AUTO: 87 FL (ref 79–97)
MICRODELETION SYND BLD/T FISH: NORMAL
MONOCYTES # BLD AUTO: 0.5 X10E3/UL (ref 0.1–0.9)
MONOCYTES NFR BLD AUTO: 7 %
NEUTROPHILS # BLD AUTO: 4.4 X10E3/UL (ref 1.4–7)
NEUTROPHILS NFR BLD AUTO: 63 %
PLATELET # BLD AUTO: 315 X10E3/UL (ref 150–450)
POTASSIUM SERPL-SCNC: 4.9 MMOL/L (ref 3.5–5.2)
PROT SERPL-MCNC: 7.3 G/DL (ref 6–8.5)
RBC # BLD AUTO: 4.9 X10E6/UL (ref 3.77–5.28)
SL AMB EGFR AFRICAN AMERICAN: 108 ML/MIN/1.73
SL AMB EGFR NON AFRICAN AMERICAN: 94 ML/MIN/1.73
SL AMB VLDL CHOLESTEROL CALC: 16 MG/DL (ref 5–40)
SODIUM SERPL-SCNC: 143 MMOL/L (ref 134–144)
TRIGL SERPL-MCNC: 80 MG/DL (ref 0–149)
TSH SERPL DL<=0.005 MIU/L-ACNC: 2.36 UIU/ML (ref 0.45–4.5)
WBC # BLD AUTO: 7 X10E3/UL (ref 3.4–10.8)

## 2019-10-30 ENCOUNTER — IMMUNIZATIONS (OUTPATIENT)
Dept: FAMILY MEDICINE CLINIC | Facility: CLINIC | Age: 64
End: 2019-10-30
Payer: COMMERCIAL

## 2019-10-30 DIAGNOSIS — Z23 FLU VACCINE NEED: Primary | ICD-10-CM

## 2019-10-30 PROCEDURE — 90471 IMMUNIZATION ADMIN: CPT

## 2019-10-30 PROCEDURE — 90662 IIV NO PRSV INCREASED AG IM: CPT

## 2020-01-03 DIAGNOSIS — I10 ESSENTIAL HYPERTENSION: ICD-10-CM

## 2020-01-03 DIAGNOSIS — E78.2 MIXED HYPERLIPIDEMIA: ICD-10-CM

## 2020-01-03 RX ORDER — AMLODIPINE BESYLATE 5 MG/1
5 TABLET ORAL DAILY
Qty: 90 TABLET | Refills: 0 | Status: SHIPPED | OUTPATIENT
Start: 2020-01-03 | End: 2020-01-03 | Stop reason: SDUPTHER

## 2020-01-03 RX ORDER — AMLODIPINE BESYLATE 5 MG/1
5 TABLET ORAL DAILY
Qty: 90 TABLET | Refills: 2 | Status: SHIPPED | OUTPATIENT
Start: 2020-01-03 | End: 2020-08-03 | Stop reason: SDUPTHER

## 2020-01-03 RX ORDER — ATORVASTATIN CALCIUM 10 MG/1
10 TABLET, FILM COATED ORAL DAILY
Qty: 90 TABLET | Refills: 0 | Status: SHIPPED | OUTPATIENT
Start: 2020-01-03 | End: 2020-01-03 | Stop reason: SDUPTHER

## 2020-01-03 RX ORDER — ATORVASTATIN CALCIUM 10 MG/1
10 TABLET, FILM COATED ORAL DAILY
Qty: 90 TABLET | Refills: 2 | Status: SHIPPED | OUTPATIENT
Start: 2020-01-03 | End: 2020-08-03 | Stop reason: SDUPTHER

## 2020-08-03 DIAGNOSIS — I10 ESSENTIAL HYPERTENSION: ICD-10-CM

## 2020-08-03 DIAGNOSIS — E78.2 MIXED HYPERLIPIDEMIA: ICD-10-CM

## 2020-08-03 RX ORDER — ATORVASTATIN CALCIUM 10 MG/1
10 TABLET, FILM COATED ORAL DAILY
Qty: 90 TABLET | Refills: 3 | Status: SHIPPED | OUTPATIENT
Start: 2020-08-03 | End: 2020-11-24 | Stop reason: SDUPTHER

## 2020-08-03 RX ORDER — AMLODIPINE BESYLATE 5 MG/1
5 TABLET ORAL DAILY
Qty: 90 TABLET | Refills: 3 | Status: SHIPPED | OUTPATIENT
Start: 2020-08-03 | End: 2020-11-24 | Stop reason: SDUPTHER

## 2020-09-30 ENCOUNTER — TELEPHONE (OUTPATIENT)
Dept: GASTROENTEROLOGY | Facility: AMBULARY SURGERY CENTER | Age: 65
End: 2020-09-30

## 2020-09-30 NOTE — TELEPHONE ENCOUNTER
09/30/20  Screened by: Awa Bose    Referring Provider     : If patient answers NO to medical questions, then schedule procedure  If patient answers YES to medical questions, then schedule office appointment  Previous Colonoscopy no  Date and Facility of last colonoscopy? [facility] #  [phone]      Pre- Screening: There is no height or weight on file to calculate BMI  Has patient been referred for a routine screening Colonoscopy? yes  Is the patient between 39-70 years old? yes    SCHEDULING STAFF   If the patient is between 45yrs-49yrs, please advise patient to confirm benefits/coverage with their insurance company for a routine screening colonoscopy, some insurance carriers will only cover at [address] or older   If the patient is over 76years old, please schedule an office visit   If the patient had a previous colonoscopy send to the procedure  before continuing    Have you been diagnosed with a bleeding disorder or anemia? no    Do you take Other blood thinning medications no    Have you been diagnosed with Diabetes or are you taking any   Diabetic medications? no    Do you have any of the following symptoms? NO      Have you had a coronary or vascular stent within the last year? no    Have you had a heart attack or stroke in the last 6 months? no    Have you had intestinal surgery in the last 3 months? no    Do you have problems with: Sleep Apnea    Do you use: CPAP/BiPAP    Have you been hospitalized in the last Month? no    Have you had chest pain (angina) or breathing problems  (COPD) in the last 3 months? no    Do you have any difficulty walking up a flight of stairs? no    Have you had Kidney failure or insufficiency? no    Have you had heart valve surgery? no    Are you confined to a wheelchair?  no How Severe Is It?: mild Is This A New Presentation, Or A Follow-Up?: Follow Up Accutane

## 2020-10-01 NOTE — TELEPHONE ENCOUNTER
Pt returned call, scheduled consult with Dr Michelle Stout at [facility] on 10/26  Mailed appointment card  Personal history of colon polyps  Report in chart from 2012   Pt unable complete colonoscopy with Dr Martinez Sessions 2018 due to being dehydrated and High Blood pressure/CAD

## 2020-10-26 ENCOUNTER — OFFICE VISIT (OUTPATIENT)
Dept: GASTROENTEROLOGY | Facility: CLINIC | Age: 65
End: 2020-10-26
Payer: COMMERCIAL

## 2020-10-26 VITALS
HEART RATE: 80 BPM | TEMPERATURE: 97.6 F | BODY MASS INDEX: 41.42 KG/M2 | RESPIRATION RATE: 18 BRPM | WEIGHT: 219.4 LBS | HEIGHT: 61 IN

## 2020-10-26 DIAGNOSIS — Z86.010 HISTORY OF COLON POLYPS: Primary | ICD-10-CM

## 2020-10-26 PROCEDURE — 99213 OFFICE O/P EST LOW 20 MIN: CPT | Performed by: INTERNAL MEDICINE

## 2020-10-26 RX ORDER — SODIUM, POTASSIUM,MAG SULFATES 17.5-3.13G
2 SOLUTION, RECONSTITUTED, ORAL ORAL SEE ADMIN INSTRUCTIONS
Qty: 2 BOTTLE | Refills: 0 | Status: SHIPPED | OUTPATIENT
Start: 2020-10-26 | End: 2020-11-24

## 2020-11-17 DIAGNOSIS — Z11.59 SCREENING FOR VIRAL DISEASE: ICD-10-CM

## 2020-11-17 PROCEDURE — U0003 INFECTIOUS AGENT DETECTION BY NUCLEIC ACID (DNA OR RNA); SEVERE ACUTE RESPIRATORY SYNDROME CORONAVIRUS 2 (SARS-COV-2) (CORONAVIRUS DISEASE [COVID-19]), AMPLIFIED PROBE TECHNIQUE, MAKING USE OF HIGH THROUGHPUT TECHNOLOGIES AS DESCRIBED BY CMS-2020-01-R: HCPCS | Performed by: INTERNAL MEDICINE

## 2020-11-18 LAB — SARS-COV-2 RNA SPEC QL NAA+PROBE: NOT DETECTED

## 2020-11-19 RX ORDER — ASPIRIN 81 MG/1
81 TABLET ORAL DAILY
COMMUNITY

## 2020-11-22 ENCOUNTER — ANESTHESIA EVENT (OUTPATIENT)
Dept: GASTROENTEROLOGY | Facility: AMBULARY SURGERY CENTER | Age: 65
End: 2020-11-22

## 2020-11-23 ENCOUNTER — ANESTHESIA (OUTPATIENT)
Dept: GASTROENTEROLOGY | Facility: AMBULARY SURGERY CENTER | Age: 65
End: 2020-11-23

## 2020-11-23 ENCOUNTER — HOSPITAL ENCOUNTER (OUTPATIENT)
Dept: GASTROENTEROLOGY | Facility: AMBULARY SURGERY CENTER | Age: 65
Setting detail: OUTPATIENT SURGERY
Discharge: HOME | End: 2020-11-23
Attending: INTERNAL MEDICINE | Admitting: INTERNAL MEDICINE
Payer: COMMERCIAL

## 2020-11-23 VITALS
TEMPERATURE: 97.6 F | OXYGEN SATURATION: 97 % | DIASTOLIC BLOOD PRESSURE: 86 MMHG | HEART RATE: 68 BPM | SYSTOLIC BLOOD PRESSURE: 143 MMHG | RESPIRATION RATE: 18 BRPM | BODY MASS INDEX: 41.35 KG/M2 | WEIGHT: 219 LBS | HEIGHT: 61 IN

## 2020-11-23 VITALS — HEART RATE: 69 BPM

## 2020-11-23 DIAGNOSIS — Z86.010 HISTORY OF COLON POLYPS: ICD-10-CM

## 2020-11-23 PROBLEM — Z98.61 HISTORY OF PTCA: Status: ACTIVE | Noted: 2020-11-23

## 2020-11-23 PROCEDURE — 88305 TISSUE EXAM BY PATHOLOGIST: CPT | Performed by: PATHOLOGY

## 2020-11-23 PROCEDURE — 45380 COLONOSCOPY AND BIOPSY: CPT | Performed by: INTERNAL MEDICINE

## 2020-11-23 RX ORDER — PROPOFOL 10 MG/ML
INJECTION, EMULSION INTRAVENOUS AS NEEDED
Status: DISCONTINUED | OUTPATIENT
Start: 2020-11-23 | End: 2020-11-23

## 2020-11-23 RX ORDER — SODIUM CHLORIDE, SODIUM LACTATE, POTASSIUM CHLORIDE, CALCIUM CHLORIDE 600; 310; 30; 20 MG/100ML; MG/100ML; MG/100ML; MG/100ML
75 INJECTION, SOLUTION INTRAVENOUS CONTINUOUS
Status: DISCONTINUED | OUTPATIENT
Start: 2020-11-23 | End: 2020-11-27 | Stop reason: HOSPADM

## 2020-11-23 RX ORDER — SODIUM CHLORIDE, SODIUM LACTATE, POTASSIUM CHLORIDE, CALCIUM CHLORIDE 600; 310; 30; 20 MG/100ML; MG/100ML; MG/100ML; MG/100ML
INJECTION, SOLUTION INTRAVENOUS CONTINUOUS PRN
Status: DISCONTINUED | OUTPATIENT
Start: 2020-11-23 | End: 2020-11-23

## 2020-11-23 RX ADMIN — PROPOFOL 100 MG: 10 INJECTION, EMULSION INTRAVENOUS at 09:31

## 2020-11-23 RX ADMIN — LIDOCAINE HYDROCHLORIDE 40 MG: 20 INJECTION, SOLUTION INTRAVENOUS at 09:33

## 2020-11-23 RX ADMIN — PROPOFOL 50 MG: 10 INJECTION, EMULSION INTRAVENOUS at 09:37

## 2020-11-23 RX ADMIN — SODIUM CHLORIDE, SODIUM LACTATE, POTASSIUM CHLORIDE, AND CALCIUM CHLORIDE: .6; .31; .03; .02 INJECTION, SOLUTION INTRAVENOUS at 09:28

## 2020-11-23 RX ADMIN — PROPOFOL 30 MG: 10 INJECTION, EMULSION INTRAVENOUS at 09:45

## 2020-11-23 RX ADMIN — PROPOFOL 50 MG: 10 INJECTION, EMULSION INTRAVENOUS at 09:34

## 2020-11-24 ENCOUNTER — OFFICE VISIT (OUTPATIENT)
Dept: FAMILY MEDICINE CLINIC | Facility: CLINIC | Age: 65
End: 2020-11-24
Payer: COMMERCIAL

## 2020-11-24 VITALS
HEART RATE: 78 BPM | OXYGEN SATURATION: 96 % | TEMPERATURE: 97.4 F | BODY MASS INDEX: 40.59 KG/M2 | HEIGHT: 61 IN | WEIGHT: 215 LBS | RESPIRATION RATE: 18 BRPM | SYSTOLIC BLOOD PRESSURE: 130 MMHG | DIASTOLIC BLOOD PRESSURE: 80 MMHG

## 2020-11-24 DIAGNOSIS — T80.818A: Primary | ICD-10-CM

## 2020-11-24 DIAGNOSIS — E78.2 MIXED HYPERLIPIDEMIA: ICD-10-CM

## 2020-11-24 DIAGNOSIS — I10 ESSENTIAL HYPERTENSION: ICD-10-CM

## 2020-11-24 PROCEDURE — 3008F BODY MASS INDEX DOCD: CPT | Performed by: NURSE PRACTITIONER

## 2020-11-24 PROCEDURE — 99213 OFFICE O/P EST LOW 20 MIN: CPT | Performed by: NURSE PRACTITIONER

## 2020-11-24 PROCEDURE — 1036F TOBACCO NON-USER: CPT | Performed by: NURSE PRACTITIONER

## 2020-11-24 PROCEDURE — 1160F RVW MEDS BY RX/DR IN RCRD: CPT | Performed by: NURSE PRACTITIONER

## 2020-11-24 PROCEDURE — 3079F DIAST BP 80-89 MM HG: CPT | Performed by: NURSE PRACTITIONER

## 2020-11-24 PROCEDURE — 3075F SYST BP GE 130 - 139MM HG: CPT | Performed by: NURSE PRACTITIONER

## 2020-11-24 PROCEDURE — 3725F SCREEN DEPRESSION PERFORMED: CPT | Performed by: NURSE PRACTITIONER

## 2020-11-24 RX ORDER — CEPHALEXIN 500 MG/1
500 CAPSULE ORAL EVERY 12 HOURS SCHEDULED
Qty: 14 CAPSULE | Refills: 0 | Status: SHIPPED | OUTPATIENT
Start: 2020-11-24 | End: 2020-12-01

## 2020-11-24 RX ORDER — AMLODIPINE BESYLATE 5 MG/1
5 TABLET ORAL DAILY
Qty: 90 TABLET | Refills: 1 | Status: SHIPPED | OUTPATIENT
Start: 2020-11-24 | End: 2021-09-28 | Stop reason: SDUPTHER

## 2020-11-24 RX ORDER — ATORVASTATIN CALCIUM 10 MG/1
10 TABLET, FILM COATED ORAL DAILY
Qty: 90 TABLET | Refills: 1 | Status: SHIPPED | OUTPATIENT
Start: 2020-11-24 | End: 2021-09-28 | Stop reason: SDUPTHER

## 2021-01-14 ENCOUNTER — TELEMEDICINE (OUTPATIENT)
Dept: FAMILY MEDICINE CLINIC | Facility: CLINIC | Age: 66
End: 2021-01-14
Payer: COMMERCIAL

## 2021-01-14 DIAGNOSIS — B34.9 VIRAL ILLNESS: Primary | ICD-10-CM

## 2021-01-14 PROCEDURE — 1160F RVW MEDS BY RX/DR IN RCRD: CPT | Performed by: NURSE PRACTITIONER

## 2021-01-14 PROCEDURE — 1036F TOBACCO NON-USER: CPT | Performed by: NURSE PRACTITIONER

## 2021-01-14 PROCEDURE — 99213 OFFICE O/P EST LOW 20 MIN: CPT | Performed by: NURSE PRACTITIONER

## 2021-01-14 NOTE — PROGRESS NOTES
COVID-19 Virtual Visit     Assessment/Plan:    Problem List Items Addressed This Visit     None      Visit Diagnoses     Viral illness    -  Primary         Disposition:         She will remain in quarantine until her PCR test results are available  Job requirement is completely symptom free for 48 hours for her to return to work  I have spent 7 minutes directly with the patient  Encounter provider FREDDY Fox    Provider located at  [address]  [place] [address] [address]    Recent Visits  No visits were found meeting these conditions  Showing recent visits within past 7 days and meeting all other requirements     Today's Visits  Date Type Provider Dept   01/14/21 Telemedicine Jaswant Fox today's visits and meeting all other requirements     Future Appointments  No visits were found meeting these conditions  Showing future appointments within next 150 days and meeting all other requirements        Patient agrees to participate in a virtual check in via telephone or video visit instead of presenting to the office to address urgent/immediate medical needs  Patient is aware this is a billable service  After connecting through Telephone, the patient was identified by name and date of birth  Yisel Flood was informed that this was a telemedicine visit and that the exam was being conducted confidentially over secure lines  My office door was closed  No one else was in the room  Yisel Flood acknowledged consent and understanding of privacy and security of the telemedicine visit  I informed the patient that I have reviewed her record in Epic and presented the opportunity for her to ask any questions regarding the visit today  The patient agreed to participate      It was my intent to perform this visit via video technology but the patient was not able to do a video connection so the visit was completed via audio telephone only  Subjective:   Tortsen Rao is a 77 y o  female who is concerned about COVID-19  Patient's symptoms include fever (101 2), fatigue, sore throat, myalgias and headache  Patient denies chills, malaise, congestion, rhinorrhea, anosmia, loss of taste, cough, shortness of breath, chest tightness, abdominal pain, nausea, vomiting and diarrhea  Date of symptom onset: 1/14/2021    Exposure:   Contact with a person who is under investigation (PUI) for or who is positive for COVID-19 within the last 14 days?: No    Hospitalized recently for fever and/or lower respiratory symptoms?: No      Currently a healthcare worker that is involved in direct patient care?: No      Works in a special setting where the risk of COVID-19 transmission may be high? (this may include long-term care, correctional and care home facilities; homeless shelters; assisted-living facilities and group homes ): No      Resident in a special setting where the risk of COVID-19 transmission may be high? (this may include long-term care, correctional and care home facilities; homeless shelters; assisted-living facilities and group homes ): No      Pt developed symptoms this morning, no known exposure  She is tested twice weekly at work, negative test on 1/12  She had her first dose of Moderna COVID vaccine last week, tolerated well  Had PCR was done today and sent out        Lab Results   Component Value Date    SARSCOV2 Not Detected 11/17/2020     Past Medical History:   Diagnosis Date    Colon polyp     Hyperlipidemia     Hypertension     Murmur, heart     Obesity     Wears glasses      Past Surgical History:   Procedure Laterality Date    APPENDECTOMY      CHOLECYSTECTOMY      COLONOSCOPY  2012    dr rich [facility]    HYSTERECTOMY      complete     Current Outpatient Medications   Medication Sig Dispense Refill    amLODIPine (NORVASC) 5 mg tablet Take 1 tablet (5 mg total) by mouth daily 90 tablet 1    aspirin (ECOTRIN LOW STRENGTH) 81 mg EC tablet Take 81 mg by mouth daily Last dose 11/15/2020      atorvastatin (LIPITOR) 10 mg tablet Take 1 tablet (10 mg total) by mouth daily 90 tablet 1    Multiple Vitamins-Minerals (MULTI FOR HER PO) Take by mouth daily Last dose 11/7/2020       No current facility-administered medications for this visit  Allergies   Allergen Reactions    Cortisone      Cortisone ear drop-facial swelling       Review of Systems   Constitutional: Positive for fatigue and fever (101 2)  Negative for chills  HENT: Positive for sore throat  Negative for congestion and rhinorrhea  Respiratory: Negative for cough, chest tightness and shortness of breath  Gastrointestinal: Negative for abdominal pain, diarrhea, nausea and vomiting  Musculoskeletal: Positive for myalgias  Neurological: Positive for headaches  Objective: There were no vitals filed for this visit  Physical Exam  VIRTUAL VISIT DISCLAIMER    Ritu Giancarlo acknowledges that she has consented to an online visit or consultation  She understands that the online visit is based solely on information provided by her, and that, in the absence of a face-to-face physical evaluation by the physician, the diagnosis she receives is both limited and provisional in terms of accuracy and completeness  This is not intended to replace a full medical face-to-face evaluation by the physician  Ritu Mondragon understands and accepts these terms

## 2021-01-18 ENCOUNTER — TELEPHONE (OUTPATIENT)
Dept: FAMILY MEDICINE CLINIC | Facility: CLINIC | Age: 66
End: 2021-01-18

## 2021-09-28 DIAGNOSIS — E78.2 MIXED HYPERLIPIDEMIA: ICD-10-CM

## 2021-09-28 DIAGNOSIS — I10 ESSENTIAL HYPERTENSION: ICD-10-CM

## 2021-09-29 RX ORDER — ATORVASTATIN CALCIUM 10 MG/1
10 TABLET, FILM COATED ORAL DAILY
Qty: 90 TABLET | Refills: 0 | Status: SHIPPED | OUTPATIENT
Start: 2021-09-29 | End: 2021-12-23

## 2021-09-29 RX ORDER — AMLODIPINE BESYLATE 5 MG/1
5 TABLET ORAL DAILY
Qty: 90 TABLET | Refills: 0 | Status: SHIPPED | OUTPATIENT
Start: 2021-09-29 | End: 2021-12-23

## 2021-10-21 ENCOUNTER — OFFICE VISIT (OUTPATIENT)
Dept: FAMILY MEDICINE CLINIC | Facility: CLINIC | Age: 66
End: 2021-10-21
Payer: COMMERCIAL

## 2021-10-21 VITALS
WEIGHT: 219.4 LBS | SYSTOLIC BLOOD PRESSURE: 120 MMHG | HEIGHT: 61 IN | RESPIRATION RATE: 18 BRPM | HEART RATE: 72 BPM | OXYGEN SATURATION: 98 % | TEMPERATURE: 99.2 F | BODY MASS INDEX: 41.42 KG/M2 | DIASTOLIC BLOOD PRESSURE: 82 MMHG

## 2021-10-21 DIAGNOSIS — Z12.31 ENCOUNTER FOR SCREENING MAMMOGRAM FOR MALIGNANT NEOPLASM OF BREAST: ICD-10-CM

## 2021-10-21 DIAGNOSIS — Z23 NEED FOR VACCINATION: ICD-10-CM

## 2021-10-21 DIAGNOSIS — Z13.29 SCREENING FOR THYROID DISORDER: ICD-10-CM

## 2021-10-21 DIAGNOSIS — I10 ESSENTIAL HYPERTENSION: ICD-10-CM

## 2021-10-21 DIAGNOSIS — Z13.6 SCREENING FOR CARDIOVASCULAR CONDITION: ICD-10-CM

## 2021-10-21 DIAGNOSIS — Z00.00 ROUTINE ADULT HEALTH MAINTENANCE: Primary | ICD-10-CM

## 2021-10-21 DIAGNOSIS — R73.01 IMPAIRED FASTING GLUCOSE: ICD-10-CM

## 2021-10-21 DIAGNOSIS — Z78.0 ASYMPTOMATIC MENOPAUSAL STATE: ICD-10-CM

## 2021-10-21 PROCEDURE — 3079F DIAST BP 80-89 MM HG: CPT | Performed by: NURSE PRACTITIONER

## 2021-10-21 PROCEDURE — 4040F PNEUMOC VAC/ADMIN/RCVD: CPT | Performed by: NURSE PRACTITIONER

## 2021-10-21 PROCEDURE — 1036F TOBACCO NON-USER: CPT | Performed by: NURSE PRACTITIONER

## 2021-10-21 PROCEDURE — 3008F BODY MASS INDEX DOCD: CPT | Performed by: NURSE PRACTITIONER

## 2021-10-21 PROCEDURE — 90732 PPSV23 VACC 2 YRS+ SUBQ/IM: CPT

## 2021-10-21 PROCEDURE — 99397 PER PM REEVAL EST PAT 65+ YR: CPT | Performed by: NURSE PRACTITIONER

## 2021-10-21 PROCEDURE — 90471 IMMUNIZATION ADMIN: CPT

## 2021-10-21 PROCEDURE — 3074F SYST BP LT 130 MM HG: CPT | Performed by: NURSE PRACTITIONER

## 2021-10-21 PROCEDURE — 90750 HZV VACC RECOMBINANT IM: CPT

## 2021-10-21 PROCEDURE — 1160F RVW MEDS BY RX/DR IN RCRD: CPT | Performed by: NURSE PRACTITIONER

## 2021-10-21 PROCEDURE — 90472 IMMUNIZATION ADMIN EACH ADD: CPT

## 2021-12-23 DIAGNOSIS — I10 ESSENTIAL HYPERTENSION: ICD-10-CM

## 2021-12-23 DIAGNOSIS — E78.2 MIXED HYPERLIPIDEMIA: ICD-10-CM

## 2021-12-23 RX ORDER — AMLODIPINE BESYLATE 5 MG/1
TABLET ORAL
Qty: 90 TABLET | Refills: 0 | Status: SHIPPED | OUTPATIENT
Start: 2021-12-23

## 2021-12-23 RX ORDER — ATORVASTATIN CALCIUM 10 MG/1
TABLET, FILM COATED ORAL
Qty: 90 TABLET | Refills: 0 | Status: SHIPPED | OUTPATIENT
Start: 2021-12-23

## 2022-02-08 ENCOUNTER — OFFICE VISIT (OUTPATIENT)
Dept: FAMILY MEDICINE CLINIC | Facility: CLINIC | Age: 67
End: 2022-02-08
Payer: COMMERCIAL

## 2022-02-08 VITALS
HEIGHT: 61 IN | HEART RATE: 82 BPM | OXYGEN SATURATION: 98 % | RESPIRATION RATE: 16 BRPM | DIASTOLIC BLOOD PRESSURE: 64 MMHG | WEIGHT: 220 LBS | TEMPERATURE: 97.6 F | SYSTOLIC BLOOD PRESSURE: 122 MMHG | BODY MASS INDEX: 41.54 KG/M2

## 2022-02-08 DIAGNOSIS — H11.31 SUBCONJUNCTIVAL HEMORRHAGE OF RIGHT EYE: Primary | ICD-10-CM

## 2022-02-08 DIAGNOSIS — D69.2 SENILE PURPURA (HCC): ICD-10-CM

## 2022-02-08 PROCEDURE — 3008F BODY MASS INDEX DOCD: CPT | Performed by: NURSE PRACTITIONER

## 2022-02-08 PROCEDURE — 99213 OFFICE O/P EST LOW 20 MIN: CPT | Performed by: NURSE PRACTITIONER

## 2022-02-08 PROCEDURE — 3288F FALL RISK ASSESSMENT DOCD: CPT | Performed by: NURSE PRACTITIONER

## 2022-02-08 PROCEDURE — 1101F PT FALLS ASSESS-DOCD LE1/YR: CPT | Performed by: NURSE PRACTITIONER

## 2022-02-08 PROCEDURE — 1160F RVW MEDS BY RX/DR IN RCRD: CPT | Performed by: NURSE PRACTITIONER

## 2022-02-08 PROCEDURE — 1036F TOBACCO NON-USER: CPT | Performed by: NURSE PRACTITIONER

## 2022-02-08 PROCEDURE — 3725F SCREEN DEPRESSION PERFORMED: CPT | Performed by: NURSE PRACTITIONER

## 2022-02-08 NOTE — PROGRESS NOTES
Assessment/Plan:    She will go for routine labs including CBC previously ordered  No indiciation for any additional workup at this time  Consider additional eval for any active signs of bleeding, diffuse purpura or recurrent subconjunctival hemorrhage  1  Subconjunctival hemorrhage of right eye    2  Senile purpura ([address] )            There are no Patient Instructions on file for this visit  Return if symptoms worsen or fail to improve  Subjective:      Patient ID: Kerry Harrell is a 69 y o  female  Chief Complaint   Patient presents with    concerns     pateitn would like to discuss some concerns before having cataract Guevara Toth        She has a subconjunctival hemorrhage of the right eye  Reports she woke up with it one morning, unsure what caused it  Saw her eye doctor who was concerned for underlying hematologic condition  She has frequent bruising of her hands and wrists, 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, 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  She does not have any upper arm bruising, or any bruising involving her trunk or lower extremities  She denies any other signs of bleeding  Will be going for laser cataract surgery in the spring  The following portions of the patient's history were reviewed and updated as appropriate: allergies, current medications, past family history, past medical history, past social history, past surgical history and problem list     Review of Systems   Constitutional: Negative  HENT: Negative for nosebleeds  Eyes:        See HPI   Gastrointestinal: Negative for anal bleeding  Genitourinary: Negative for hematuria and vaginal bleeding     Skin:        See HPI         Current Outpatient Medications   Medication Sig Dispense Refill    amLODIPine (NORVASC) 5 mg tablet TAKE 1 TABLET BY MOUTH EVERY DAY 90 tablet 0    aspirin (ECOTRIN LOW STRENGTH) 81 mg EC tablet Take 81 mg by mouth daily Last dose 11/15/2020      atorvastatin (LIPITOR) 10 mg tablet TAKE 1 TABLET BY MOUTH EVERY DAY 90 tablet 0    Multiple Vitamins-Minerals (MULTI FOR HER PO) Take by mouth daily Last dose 11/7/2020       No current facility-administered medications for this visit  Objective:    /64   Pulse 82   Temp 97 6 °F (36 4 °C)   Resp 16   Ht 5' 1" (1 549 m)   Wt 99 8 kg (220 lb)   SpO2 98%   BMI 41 57 kg/m²        Physical Exam  Vitals and nursing note reviewed  Constitutional:       Appearance: Normal appearance  She is well-developed  Eyes:      Comments: Left upper subconjunctival hemorrhage   Cardiovascular:      Rate and Rhythm: Normal rate and regular rhythm  Heart sounds: Normal heart sounds  No murmur heard  Pulmonary:      Effort: Pulmonary effort is normal       Breath sounds: Normal breath sounds  Skin:     General: Skin is warm and dry  Comments: Scattered purpura affectings bilateral hands and distal aspect of forearms   Neurological:      Mental Status: She is alert     Psychiatric:         Mood and Affect: Mood normal          Behavior: Behavior normal                 FREDDY Kennedy